# Patient Record
Sex: MALE | Race: WHITE | Employment: OTHER | URBAN - METROPOLITAN AREA
[De-identification: names, ages, dates, MRNs, and addresses within clinical notes are randomized per-mention and may not be internally consistent; named-entity substitution may affect disease eponyms.]

---

## 2018-03-19 ENCOUNTER — NURSE ONLY (OUTPATIENT)
Dept: NON INVASIVE DIAGNOSTICS | Age: 83
End: 2018-03-19
Payer: MEDICARE

## 2018-03-19 DIAGNOSIS — G45.9 TRANSIENT CEREBRAL ISCHEMIA, UNSPECIFIED TYPE: ICD-10-CM

## 2018-03-19 DIAGNOSIS — Z95.818 STATUS POST PLACEMENT OF IMPLANTABLE LOOP RECORDER: Primary | ICD-10-CM

## 2018-03-19 PROCEDURE — 93299 PR PR INTERRO EVALM REMOTE, UP TO 30 DAYS: CPT | Performed by: INTERNAL MEDICINE

## 2018-03-19 PROCEDURE — 93298 REM INTERROG DEV EVAL SCRMS: CPT | Performed by: INTERNAL MEDICINE

## 2018-03-22 ENCOUNTER — ANESTHESIA EVENT (OUTPATIENT)
Dept: OPERATING ROOM | Age: 83
End: 2018-03-22
Payer: MEDICARE

## 2018-03-22 ENCOUNTER — HOSPITAL ENCOUNTER (OUTPATIENT)
Age: 83
Setting detail: OUTPATIENT SURGERY
Discharge: HOME | End: 2018-03-22
Attending: OPHTHALMOLOGY | Admitting: OPHTHALMOLOGY
Payer: MEDICARE

## 2018-03-22 ENCOUNTER — ANESTHESIA (OUTPATIENT)
Dept: OPERATING ROOM | Age: 83
End: 2018-03-22
Payer: MEDICARE

## 2018-03-22 VITALS — SYSTOLIC BLOOD PRESSURE: 160 MMHG | OXYGEN SATURATION: 99 % | DIASTOLIC BLOOD PRESSURE: 70 MMHG

## 2018-03-22 VITALS
BODY MASS INDEX: 23.48 KG/M2 | DIASTOLIC BLOOD PRESSURE: 69 MMHG | HEIGHT: 70 IN | WEIGHT: 164 LBS | SYSTOLIC BLOOD PRESSURE: 164 MMHG | RESPIRATION RATE: 16 BRPM | TEMPERATURE: 97.2 F | HEART RATE: 56 BPM | OXYGEN SATURATION: 97 %

## 2018-03-22 PROBLEM — H25.13 AGE-RELATED NUCLEAR CATARACT OF BOTH EYES: Status: ACTIVE | Noted: 2018-03-22

## 2018-03-22 PROCEDURE — 2500000003 HC RX 250 WO HCPCS: Performed by: OPHTHALMOLOGY

## 2018-03-22 PROCEDURE — 3700000001 HC ADD 15 MINUTES (ANESTHESIA): Performed by: OPHTHALMOLOGY

## 2018-03-22 PROCEDURE — 6360000002 HC RX W HCPCS: Performed by: OPHTHALMOLOGY

## 2018-03-22 PROCEDURE — 2500000003 HC RX 250 WO HCPCS: Performed by: NURSE ANESTHETIST, CERTIFIED REGISTERED

## 2018-03-22 PROCEDURE — 6370000000 HC RX 637 (ALT 250 FOR IP): Performed by: OPHTHALMOLOGY

## 2018-03-22 PROCEDURE — 7100000011 HC PHASE II RECOVERY - ADDTL 15 MIN: Performed by: OPHTHALMOLOGY

## 2018-03-22 PROCEDURE — 6360000002 HC RX W HCPCS: Performed by: NURSE ANESTHETIST, CERTIFIED REGISTERED

## 2018-03-22 PROCEDURE — 2580000003 HC RX 258: Performed by: OPHTHALMOLOGY

## 2018-03-22 PROCEDURE — 3600000002 HC SURGERY LEVEL 2 BASE: Performed by: OPHTHALMOLOGY

## 2018-03-22 PROCEDURE — V2632 POST CHMBR INTRAOCULAR LENS: HCPCS | Performed by: OPHTHALMOLOGY

## 2018-03-22 PROCEDURE — 3700000000 HC ANESTHESIA ATTENDED CARE: Performed by: OPHTHALMOLOGY

## 2018-03-22 PROCEDURE — 3600000012 HC SURGERY LEVEL 2 ADDTL 15MIN: Performed by: OPHTHALMOLOGY

## 2018-03-22 PROCEDURE — 7100000010 HC PHASE II RECOVERY - FIRST 15 MIN: Performed by: OPHTHALMOLOGY

## 2018-03-22 DEVICE — LENS INTOCU +22.0 DIOPT L13MM DIA6MM 0DEG HAPTIC ANG A: Type: IMPLANTABLE DEVICE | Status: FUNCTIONAL

## 2018-03-22 RX ORDER — PROPOFOL 10 MG/ML
INJECTION, EMULSION INTRAVENOUS PRN
Status: DISCONTINUED | OUTPATIENT
Start: 2018-03-22 | End: 2018-03-22 | Stop reason: SDUPTHER

## 2018-03-22 RX ORDER — MOXIFLOXACIN 5 MG/ML
SOLUTION/ DROPS OPHTHALMIC PRN
Status: DISCONTINUED | OUTPATIENT
Start: 2018-03-22 | End: 2018-03-22 | Stop reason: HOSPADM

## 2018-03-22 RX ORDER — LIDOCAINE HYDROCHLORIDE 20 MG/ML
INJECTION, SOLUTION INFILTRATION; PERINEURAL PRN
Status: DISCONTINUED | OUTPATIENT
Start: 2018-03-22 | End: 2018-03-22 | Stop reason: SDUPTHER

## 2018-03-22 RX ORDER — KETOROLAC TROMETHAMINE 5 MG/ML
1 SOLUTION OPHTHALMIC
Status: COMPLETED | OUTPATIENT
Start: 2018-03-22 | End: 2018-03-22

## 2018-03-22 RX ORDER — CYCLOPENTOLATE HYDROCHLORIDE 10 MG/ML
1 SOLUTION/ DROPS OPHTHALMIC
Status: COMPLETED | OUTPATIENT
Start: 2018-03-22 | End: 2018-03-22

## 2018-03-22 RX ORDER — PREDNISOLONE ACETATE 10 MG/ML
SUSPENSION/ DROPS OPHTHALMIC PRN
Status: DISCONTINUED | OUTPATIENT
Start: 2018-03-22 | End: 2018-03-22 | Stop reason: HOSPADM

## 2018-03-22 RX ORDER — TROPICAMIDE 10 MG/ML
1 SOLUTION/ DROPS OPHTHALMIC
Status: COMPLETED | OUTPATIENT
Start: 2018-03-22 | End: 2018-03-22

## 2018-03-22 RX ORDER — PHENYLEPHRINE HCL 2.5 %
1 DROPS OPHTHALMIC (EYE)
Status: COMPLETED | OUTPATIENT
Start: 2018-03-22 | End: 2018-03-22

## 2018-03-22 RX ORDER — MOXIFLOXACIN 5 MG/ML
1 SOLUTION/ DROPS OPHTHALMIC
Status: COMPLETED | OUTPATIENT
Start: 2018-03-22 | End: 2018-03-22

## 2018-03-22 RX ORDER — SODIUM CHLORIDE 9 MG/ML
INJECTION, SOLUTION INTRAVENOUS CONTINUOUS
Status: DISCONTINUED | OUTPATIENT
Start: 2018-03-22 | End: 2018-03-22 | Stop reason: HOSPADM

## 2018-03-22 RX ADMIN — Medication 1 DROP: at 10:10

## 2018-03-22 RX ADMIN — Medication 1 DROP: at 09:40

## 2018-03-22 RX ADMIN — SODIUM CHLORIDE: 9 INJECTION, SOLUTION INTRAVENOUS at 10:06

## 2018-03-22 RX ADMIN — PHENYLEPHRINE HYDROCHLORIDE 1 DROP: 25 SOLUTION/ DROPS OPHTHALMIC at 09:55

## 2018-03-22 RX ADMIN — PHENYLEPHRINE HYDROCHLORIDE 1 DROP: 25 SOLUTION/ DROPS OPHTHALMIC at 09:40

## 2018-03-22 RX ADMIN — Medication 1 DROP: at 09:55

## 2018-03-22 RX ADMIN — PHENYLEPHRINE HYDROCHLORIDE 1 DROP: 25 SOLUTION/ DROPS OPHTHALMIC at 10:10

## 2018-03-22 RX ADMIN — KETOROLAC TROMETHAMINE 1 DROP: 5 SOLUTION OPHTHALMIC at 09:40

## 2018-03-22 RX ADMIN — Medication 1 DROP: at 10:25

## 2018-03-22 RX ADMIN — KETOROLAC TROMETHAMINE 1 DROP: 5 SOLUTION OPHTHALMIC at 10:10

## 2018-03-22 RX ADMIN — KETOROLAC TROMETHAMINE 1 DROP: 5 SOLUTION OPHTHALMIC at 10:25

## 2018-03-22 RX ADMIN — LIDOCAINE HYDROCHLORIDE 40 MG: 20 INJECTION, SOLUTION INFILTRATION; PERINEURAL at 10:59

## 2018-03-22 RX ADMIN — KETOROLAC TROMETHAMINE 1 DROP: 5 SOLUTION OPHTHALMIC at 09:55

## 2018-03-22 RX ADMIN — PROPOFOL 80 MG: 10 INJECTION, EMULSION INTRAVENOUS at 10:59

## 2018-03-22 RX ADMIN — PHENYLEPHRINE HYDROCHLORIDE 1 DROP: 25 SOLUTION/ DROPS OPHTHALMIC at 10:25

## 2018-03-22 ASSESSMENT — PAIN SCALES - GENERAL
PAINLEVEL_OUTOF10: 0
PAINLEVEL_OUTOF10: 0

## 2018-03-22 ASSESSMENT — PAIN - FUNCTIONAL ASSESSMENT: PAIN_FUNCTIONAL_ASSESSMENT: 0-10

## 2018-03-22 NOTE — ANESTHESIA PRE PROCEDURE
ASA 3       Induction: intravenous. Anesthetic plan and risks discussed with patient. Plan discussed with CRNA.                   Ha Copeland MD   3/22/2018

## 2018-04-04 ENCOUNTER — TELEPHONE (OUTPATIENT)
Dept: NON INVASIVE DIAGNOSTICS | Age: 83
End: 2018-04-04

## 2018-04-19 ENCOUNTER — NURSE ONLY (OUTPATIENT)
Dept: NON INVASIVE DIAGNOSTICS | Age: 83
End: 2018-04-19
Payer: MEDICARE

## 2018-04-19 DIAGNOSIS — R55 NEAR SYNCOPE: ICD-10-CM

## 2018-04-19 DIAGNOSIS — G45.9 TRANSIENT CEREBRAL ISCHEMIA, UNSPECIFIED TYPE: ICD-10-CM

## 2018-04-19 DIAGNOSIS — Z95.818 STATUS POST PLACEMENT OF IMPLANTABLE LOOP RECORDER: Primary | ICD-10-CM

## 2018-04-19 PROCEDURE — 93298 REM INTERROG DEV EVAL SCRMS: CPT | Performed by: INTERNAL MEDICINE

## 2018-04-19 PROCEDURE — 93299 PR PR INTERRO EVALM REMOTE, UP TO 30 DAYS: CPT | Performed by: INTERNAL MEDICINE

## 2018-06-05 ENCOUNTER — NURSE ONLY (OUTPATIENT)
Dept: NON INVASIVE DIAGNOSTICS | Age: 83
End: 2018-06-05
Payer: MEDICARE

## 2018-06-05 DIAGNOSIS — Z95.818 STATUS POST PLACEMENT OF IMPLANTABLE LOOP RECORDER: Primary | ICD-10-CM

## 2018-06-05 DIAGNOSIS — R55 NEAR SYNCOPE: ICD-10-CM

## 2018-06-05 PROCEDURE — 93298 REM INTERROG DEV EVAL SCRMS: CPT | Performed by: INTERNAL MEDICINE

## 2018-06-05 PROCEDURE — 93299 PR PR INTERRO EVALM REMOTE, UP TO 30 DAYS: CPT | Performed by: INTERNAL MEDICINE

## 2018-06-18 ENCOUNTER — TELEPHONE (OUTPATIENT)
Dept: NON INVASIVE DIAGNOSTICS | Age: 83
End: 2018-06-18

## 2018-07-06 ENCOUNTER — NURSE ONLY (OUTPATIENT)
Dept: NON INVASIVE DIAGNOSTICS | Age: 83
End: 2018-07-06
Payer: MEDICARE

## 2018-07-06 DIAGNOSIS — I44.0 FIRST DEGREE AV BLOCK: ICD-10-CM

## 2018-07-06 DIAGNOSIS — Z95.818 STATUS POST PLACEMENT OF IMPLANTABLE LOOP RECORDER: Primary | ICD-10-CM

## 2018-07-06 PROCEDURE — 93298 REM INTERROG DEV EVAL SCRMS: CPT | Performed by: INTERNAL MEDICINE

## 2018-07-06 PROCEDURE — 93299 PR PR INTERRO EVALM REMOTE, UP TO 30 DAYS: CPT | Performed by: INTERNAL MEDICINE

## 2018-07-13 ENCOUNTER — TELEPHONE (OUTPATIENT)
Dept: NON INVASIVE DIAGNOSTICS | Age: 83
End: 2018-07-13

## 2018-07-16 NOTE — PROGRESS NOTES
[facility] PRE-ADMISSION TESTING INSTRUCTIONS    The Preadmission Testing patient is instructed accordingly using the following criteria (check applicable):    ARRIVAL INSTRUCTIONS:  [x] Parking the day of Surgery is located in the Main Entrance lot. Upon entering the door, make an immediate right to the surgery reception desk    [x] [employer] Parking is available Monday through Friday 6 am to 6 pm    [x] Bring photo ID and insurance card    [] Bring in a copy of Living will or Durable Power of  papers. [x] Please be sure to arrange transportation to and from the hospital    [x] Please arrange for someone to be with you for the 24 hour period post procedure due to having anesthesia      GENERAL INSTRUCTIONS:    [x] Nothing by mouth after midnight, including gum, candy, mints or water    [x] You may brush your teeth, but do not swallow any water    [x] Take medications as instructed with 1-2 oz of water    [x] Stop herbal supplements and vitamins 5 days prior to procedure    [x] Follow preop dosing of blood thinners per physician instructions    [] Take 1/2 dose of evening insulin, but no insulin after midnight    [] No oral diabetic medications after midnight    [] If diabetic and have low blood sugar or feel symptomatic, take 1-2oz apple juice only    [] Bring inhalers day of surgery    [] Bring C-PAP/ Bi-Pap day of surgery    [] Bring urine specimen day of surgery    [x] Shower or bath with soap, lather and rinse well, AM of Surgery, no lotion, powders or creams     [] Follow bowel prep as instructed per surgeon    [x] No tobacco products within 24 hours of surgery     [] No alcohol or illegal drug use within 24 hours of surgery.     [x] Jewelry, body piercing's, eyeglasses, contact lenses and dentures are not permitted into surgery (bring cases)      [] Please do not wear any nail polish, make up or hair products on the day of surgery    [x] If not already done, you

## 2018-07-27 ENCOUNTER — ANESTHESIA (OUTPATIENT)
Dept: OPERATING ROOM | Age: 83
End: 2018-07-27
Payer: MEDICARE

## 2018-07-27 ENCOUNTER — ANESTHESIA EVENT (OUTPATIENT)
Dept: OPERATING ROOM | Age: 83
End: 2018-07-27
Payer: MEDICARE

## 2018-07-27 ENCOUNTER — HOSPITAL ENCOUNTER (OUTPATIENT)
Age: 83
Setting detail: OUTPATIENT SURGERY
Discharge: HOME | End: 2018-07-27
Attending: OTOLARYNGOLOGY | Admitting: OTOLARYNGOLOGY
Payer: MEDICARE

## 2018-07-27 VITALS
OXYGEN SATURATION: 95 % | SYSTOLIC BLOOD PRESSURE: 157 MMHG | WEIGHT: 160 LBS | BODY MASS INDEX: 22.9 KG/M2 | TEMPERATURE: 97.2 F | HEART RATE: 50 BPM | DIASTOLIC BLOOD PRESSURE: 70 MMHG | RESPIRATION RATE: 18 BRPM | HEIGHT: 70 IN

## 2018-07-27 VITALS — DIASTOLIC BLOOD PRESSURE: 63 MMHG | SYSTOLIC BLOOD PRESSURE: 140 MMHG | OXYGEN SATURATION: 100 %

## 2018-07-27 PROBLEM — H65.21 CHRONIC SEROUS OTITIS MEDIA OF RIGHT EAR: Status: ACTIVE | Noted: 2018-07-27

## 2018-07-27 PROCEDURE — 3600000002 HC SURGERY LEVEL 2 BASE: Performed by: OTOLARYNGOLOGY

## 2018-07-27 PROCEDURE — 3700000000 HC ANESTHESIA ATTENDED CARE: Performed by: OTOLARYNGOLOGY

## 2018-07-27 PROCEDURE — 7100000010 HC PHASE II RECOVERY - FIRST 15 MIN: Performed by: OTOLARYNGOLOGY

## 2018-07-27 PROCEDURE — 7100000011 HC PHASE II RECOVERY - ADDTL 15 MIN: Performed by: OTOLARYNGOLOGY

## 2018-07-27 PROCEDURE — 6370000000 HC RX 637 (ALT 250 FOR IP)

## 2018-07-27 PROCEDURE — 7100000001 HC PACU RECOVERY - ADDTL 15 MIN: Performed by: OTOLARYNGOLOGY

## 2018-07-27 PROCEDURE — 6360000002 HC RX W HCPCS: Performed by: NURSE ANESTHETIST, CERTIFIED REGISTERED

## 2018-07-27 PROCEDURE — 7100000000 HC PACU RECOVERY - FIRST 15 MIN: Performed by: OTOLARYNGOLOGY

## 2018-07-27 PROCEDURE — 6370000000 HC RX 637 (ALT 250 FOR IP): Performed by: OTOLARYNGOLOGY

## 2018-07-27 PROCEDURE — 2709999900 HC NON-CHARGEABLE SUPPLY: Performed by: OTOLARYNGOLOGY

## 2018-07-27 PROCEDURE — 2780000010 HC IMPLANT OTHER: Performed by: OTOLARYNGOLOGY

## 2018-07-27 PROCEDURE — 2500000003 HC RX 250 WO HCPCS: Performed by: NURSE ANESTHETIST, CERTIFIED REGISTERED

## 2018-07-27 PROCEDURE — 3600000012 HC SURGERY LEVEL 2 ADDTL 15MIN: Performed by: OTOLARYNGOLOGY

## 2018-07-27 PROCEDURE — 3700000001 HC ADD 15 MINUTES (ANESTHESIA): Performed by: OTOLARYNGOLOGY

## 2018-07-27 PROCEDURE — 2580000003 HC RX 258: Performed by: NURSE ANESTHETIST, CERTIFIED REGISTERED

## 2018-07-27 DEVICE — IMPLANTABLE DEVICE: Type: IMPLANTABLE DEVICE | Site: EAR | Status: FUNCTIONAL

## 2018-07-27 RX ORDER — GLYCOPYRROLATE 0.2 MG/ML
INJECTION INTRAMUSCULAR; INTRAVENOUS PRN
Status: DISCONTINUED | OUTPATIENT
Start: 2018-07-27 | End: 2018-07-27 | Stop reason: SDUPTHER

## 2018-07-27 RX ORDER — LIDOCAINE HYDROCHLORIDE 20 MG/ML
INJECTION, SOLUTION INFILTRATION; PERINEURAL PRN
Status: DISCONTINUED | OUTPATIENT
Start: 2018-07-27 | End: 2018-07-27 | Stop reason: SDUPTHER

## 2018-07-27 RX ORDER — SODIUM CHLORIDE 0.9 % (FLUSH) 0.9 %
10 SYRINGE (ML) INJECTION PRN
Status: CANCELLED | OUTPATIENT
Start: 2018-07-27

## 2018-07-27 RX ORDER — SODIUM CHLORIDE 0.9 % (FLUSH) 0.9 %
10 SYRINGE (ML) INJECTION PRN
Status: DISCONTINUED | OUTPATIENT
Start: 2018-07-27 | End: 2018-07-27 | Stop reason: HOSPADM

## 2018-07-27 RX ORDER — ACETAMINOPHEN 325 MG/1
650 TABLET ORAL EVERY 4 HOURS PRN
Status: CANCELLED | OUTPATIENT
Start: 2018-07-27

## 2018-07-27 RX ORDER — HYDROCODONE BITARTRATE AND ACETAMINOPHEN 5; 325 MG/1; MG/1
1 TABLET ORAL
Status: DISCONTINUED | OUTPATIENT
Start: 2018-07-27 | End: 2018-07-27 | Stop reason: HOSPADM

## 2018-07-27 RX ORDER — SODIUM CHLORIDE 0.9 % (FLUSH) 0.9 %
10 SYRINGE (ML) INJECTION EVERY 12 HOURS SCHEDULED
Status: CANCELLED | OUTPATIENT
Start: 2018-07-27

## 2018-07-27 RX ORDER — SODIUM CHLORIDE, SODIUM LACTATE, POTASSIUM CHLORIDE, CALCIUM CHLORIDE 600; 310; 30; 20 MG/100ML; MG/100ML; MG/100ML; MG/100ML
INJECTION, SOLUTION INTRAVENOUS CONTINUOUS
Status: CANCELLED | OUTPATIENT
Start: 2018-07-27

## 2018-07-27 RX ORDER — CIPROFLOXACIN AND DEXAMETHASONE 3; 1 MG/ML; MG/ML
4 SUSPENSION/ DROPS AURICULAR (OTIC) 2 TIMES DAILY
Qty: 1 BOTTLE | Refills: 1 | Status: SHIPPED | OUTPATIENT
Start: 2018-07-27 | End: 2018-07-30

## 2018-07-27 RX ORDER — SODIUM CHLORIDE 0.9 % (FLUSH) 0.9 %
10 SYRINGE (ML) INJECTION EVERY 12 HOURS SCHEDULED
Status: DISCONTINUED | OUTPATIENT
Start: 2018-07-27 | End: 2018-07-27 | Stop reason: HOSPADM

## 2018-07-27 RX ORDER — ONDANSETRON 2 MG/ML
INJECTION INTRAMUSCULAR; INTRAVENOUS PRN
Status: DISCONTINUED | OUTPATIENT
Start: 2018-07-27 | End: 2018-07-27 | Stop reason: SDUPTHER

## 2018-07-27 RX ORDER — ONDANSETRON 2 MG/ML
4 INJECTION INTRAMUSCULAR; INTRAVENOUS EVERY 6 HOURS PRN
Status: CANCELLED | OUTPATIENT
Start: 2018-07-27

## 2018-07-27 RX ORDER — SODIUM CHLORIDE, SODIUM LACTATE, POTASSIUM CHLORIDE, CALCIUM CHLORIDE 600; 310; 30; 20 MG/100ML; MG/100ML; MG/100ML; MG/100ML
INJECTION, SOLUTION INTRAVENOUS CONTINUOUS
Status: DISCONTINUED | OUTPATIENT
Start: 2018-07-27 | End: 2018-07-27 | Stop reason: HOSPADM

## 2018-07-27 RX ORDER — SODIUM CHLORIDE 9 MG/ML
INJECTION, SOLUTION INTRAVENOUS CONTINUOUS PRN
Status: DISCONTINUED | OUTPATIENT
Start: 2018-07-27 | End: 2018-07-27 | Stop reason: SDUPTHER

## 2018-07-27 RX ORDER — OFLOXACIN 3 MG/ML
SOLUTION/ DROPS OPHTHALMIC PRN
Status: DISCONTINUED | OUTPATIENT
Start: 2018-07-27 | End: 2018-07-27 | Stop reason: HOSPADM

## 2018-07-27 RX ORDER — DEXAMETHASONE SODIUM PHOSPHATE 4 MG/ML
INJECTION, SOLUTION INTRA-ARTICULAR; INTRALESIONAL; INTRAMUSCULAR; INTRAVENOUS; SOFT TISSUE PRN
Status: DISCONTINUED | OUTPATIENT
Start: 2018-07-27 | End: 2018-07-27 | Stop reason: SDUPTHER

## 2018-07-27 RX ORDER — FENTANYL CITRATE 50 UG/ML
50 INJECTION, SOLUTION INTRAMUSCULAR; INTRAVENOUS EVERY 5 MIN PRN
Status: DISCONTINUED | OUTPATIENT
Start: 2018-07-27 | End: 2018-07-27 | Stop reason: HOSPADM

## 2018-07-27 RX ORDER — FENTANYL CITRATE 50 UG/ML
INJECTION, SOLUTION INTRAMUSCULAR; INTRAVENOUS PRN
Status: DISCONTINUED | OUTPATIENT
Start: 2018-07-27 | End: 2018-07-27 | Stop reason: SDUPTHER

## 2018-07-27 RX ADMIN — LIDOCAINE HYDROCHLORIDE 25 MG: 20 INJECTION, SOLUTION INFILTRATION; PERINEURAL at 12:59

## 2018-07-27 RX ADMIN — SODIUM CHLORIDE: 9 INJECTION, SOLUTION INTRAVENOUS at 12:54

## 2018-07-27 RX ADMIN — FENTANYL CITRATE 25 MCG: 50 INJECTION, SOLUTION INTRAMUSCULAR; INTRAVENOUS at 12:59

## 2018-07-27 RX ADMIN — DEXAMETHASONE SODIUM PHOSPHATE 10 MG: 4 INJECTION, SOLUTION INTRAMUSCULAR; INTRAVENOUS at 12:59

## 2018-07-27 RX ADMIN — ONDANSETRON HYDROCHLORIDE 4 MG: 2 INJECTION, SOLUTION INTRAMUSCULAR; INTRAVENOUS at 12:59

## 2018-07-27 RX ADMIN — Medication 0.1 MG: at 12:59

## 2018-07-27 ASSESSMENT — PULMONARY FUNCTION TESTS
PIF_VALUE: 1
PIF_VALUE: 20
PIF_VALUE: 1
PIF_VALUE: 16
PIF_VALUE: 1
PIF_VALUE: 20
PIF_VALUE: 1
PIF_VALUE: 1
PIF_VALUE: 12
PIF_VALUE: 5
PIF_VALUE: 15
PIF_VALUE: 8
PIF_VALUE: 18
PIF_VALUE: 1
PIF_VALUE: 7
PIF_VALUE: 21
PIF_VALUE: 3
PIF_VALUE: 3

## 2018-07-27 ASSESSMENT — PAIN SCALES - GENERAL
PAINLEVEL_OUTOF10: 0

## 2018-07-27 ASSESSMENT — PAIN DESCRIPTION - PAIN TYPE: TYPE: SURGICAL PAIN

## 2018-07-27 ASSESSMENT — PAIN - FUNCTIONAL ASSESSMENT: PAIN_FUNCTIONAL_ASSESSMENT: 0-10

## 2018-07-27 NOTE — OP NOTE
7/27/2018  Sammie Santana  [id number]    Pre-operative Diagnosis: Chronic serous otitis media of right ear    Post-operative Diagnosis: same    Procedure: Right myringotomy with T-tube placement    Anesthesia: General mask inhalational anesthesia    Surgeons/Assistants: Rosa    Estimated Blood Loss: less than 94NC     Complications: None    Specimens: not obtained    Indication: Patient presented with a history of chronic serous otitis media of right ear for the last several years     Procedure: Patient was consented preoperatively, taken to the OR and identified appropriately. Patient was placed in the supine position and given to anesthesia for induction via face mask. Once the patient was anesthetized, a microscope was brought in and a speculum was placed in the right ear and the external auditory canal was cleared of cerumen with a curette. With the tympanic membrane visualized, there was not infection and was copious amount of serous effusion present. A myringotomy knife was used to make an incision in the anterior-inferior portion of the TM. Effusion was removed with a #3 Barnett tip suction until the middle ear space was cleared. A T  tube was place in the incision. Several drops of Ofloxacin were administered in the patient's external ear canal and dressed with a cotton ball. The patient was then given back to anesthesia for proper awakening. Patient tolerated the procedure with no complications. Dr. Jared Wen was present and scrubbed for key portions of the case.     Electronically signed by Seth Chi on 7/27/18 at 1:08 PM

## 2018-07-27 NOTE — ANESTHESIA POSTPROCEDURE EVALUATION
Department of Anesthesiology  Postprocedure Note    Patient: Trixie Dao  MRN: [id number]  YOB: 1928  Date of evaluation: 7/27/2018  Time:  2:52 PM     Procedure Summary     Date:  07/27/18 Room / Location:  [facility] OR 01 / [facility] OR    Anesthesia Start:  1254 Anesthesia Stop:  1320    Procedure:  RIGHT MYRINGOTOMY  T TUBE INSERTION (Right Ear) Diagnosis:  (HEARING LOSS)    Surgeon:  Zenon Kothari MD Responsible Provider:  Sandra Giordano DO    Anesthesia Type:  general ASA Status:  3          Anesthesia Type: general    Dre Phase I: Dre Score: 10    Dre Phase II: Dre Score: 10    Last vitals: Reviewed and per EMR flowsheets.        Anesthesia Post Evaluation    Patient location during evaluation: PACU  Patient participation: complete - patient participated  Level of consciousness: awake and alert  Airway patency: patent  Nausea & Vomiting: no nausea and no vomiting  Complications: no  Cardiovascular status: hemodynamically stable  Respiratory status: acceptable  Hydration status: euvolemic

## 2018-07-27 NOTE — BRIEF OP NOTE
Brief Postoperative Note  ______________________________________________________________    Patient: Tree Goldsmith  YOB: 1928  MRN: [id number]  Date of Procedure: 7/27/2018    Pre-Op Diagnosis: HEARING LOSS    Post-Op Diagnosis: Same       Procedure(s):  RIGHT MYRINGOTOMY POSS T TUBE INSERTION    Anesthesia: Anesthesia type not filed in the log.     Surgeon(s):  Ariadne Adam MD    Staff:  Nikki Lan Person First: Shyam Dorantes     Estimated Blood Loss: * No values recorded between 7/27/2018 12:00 AM and 7/27/2018 31:59 PM * mL    Complications: None    Specimens:   * No specimens in log *    Implants:  * No implants in log *      Drains:      Findings: See dictation    Moy Kilgore MD  Date: 7/27/2018  Time: 12:32 PM

## 2018-07-27 NOTE — H&P
The H&P has been reviewed, the patient has been examined, and I concur with the findings of the 7/2018 H&P.

## 2018-07-27 NOTE — ANESTHESIA PRE PROCEDURE
 Hyperlipidemia E78.5    Orthostatic hypotension I95.1    Near syncope R55    Transient cerebral ischemia G45.9    Status post placement of implantable loop recorder Z95.818    Age-related nuclear cataract of both eyes H25.13       Past Medical History:        Diagnosis Date    Bradycardia     hx of / follows with Dr. Reymundo Nelson Depression     with anxiety    Dizziness     as of 7/16/2018 denies any current issues. / has Loop Recorder in place / follows with Dr. Reymundo Nelson Fluid level behind tympanic membrane of right ear     with retraction    Full dentures     [place] (hard of hearing[place]      aids x 2    Hypertension     Snores     Tremors of nervous system     of hands only       Past Surgical History:        Procedure Laterality Date    CATARACT REMOVAL WITH IMPLANT Left 03/22/2018     LEFT EYE CATARACT EMULSIFICATION IOL IMPLANT  (Left Eye)    COLECTOMY  2012?     benign mass of colon    HERNIA REPAIR  years ago    inguinal    INSERTABLE CARDIAC MONITOR  04/25/2016    left upper chest    MYRINGOTOMY AND TYMPANOSTOMY TUBE PLACEMENT Right 4/14/16    [place] REMV CATARACT EXTRACAP,INSERT LENS Left 3/22/2018    LEFT EYE CATARACT EMULSIFICATION IOL IMPLANT  performed by [facility] at [facility] 22 TYMPANOSTOMY TUBE PLACEMENT Right     x2       Social History:    Social History   Substance Use Topics    Smoking status: Former Smoker     Quit date: 7/16/1962    Smokeless tobacco: Current User     Types: Chew    Alcohol use No                                Ready to quit: Not Answered  Counseling given: Not Answered      Vital Signs (Current):   Vitals:    07/16/18 1438 07/27/18 0953   BP:  (!) 151/72   Pulse:  (!) 45   Resp:  18   Temp:  97.3 °F (36.3 °C)   TempSrc:  Temporal   SpO2:  95%   Weight: 160 lb (72.6 kg) 160 lb (72.6 kg)   Height: 5' 10\" (1.778 m) 5' 10\" (1.778 m)                                              BP Readings from Last 3 Encounters:   07/27/18 (!) 151/72   03/22/18 (!) hypotension     Neuro/Psych:   (+) TIA, depression/anxiety    (-) psychiatric history            ROS comment: Near syncope GI/Hepatic/Renal: Neg GI/Hepatic/Renal ROS            Endo/Other: Negative Endo/Other ROS                     ROS comment: Fluid level behind tympanic membrane of right ear with retraction Abdominal:           Vascular: negative vascular ROS. Anesthesia Plan      general     ASA 3       Induction: intravenous. MIPS: Postoperative opioids intended. Anesthetic plan and risks discussed with patient. Plan discussed with CRNA.             [address] Manny Rao,    7/27/2018

## 2018-08-14 ENCOUNTER — NURSE ONLY (OUTPATIENT)
Dept: NON INVASIVE DIAGNOSTICS | Age: 83
End: 2018-08-14
Payer: MEDICARE

## 2018-08-14 ENCOUNTER — TELEPHONE (OUTPATIENT)
Dept: NON INVASIVE DIAGNOSTICS | Age: 83
End: 2018-08-14

## 2018-08-14 DIAGNOSIS — Z95.818 STATUS POST PLACEMENT OF IMPLANTABLE LOOP RECORDER: Primary | ICD-10-CM

## 2018-08-14 DIAGNOSIS — R55 SYNCOPE, UNSPECIFIED SYNCOPE TYPE: ICD-10-CM

## 2018-08-14 PROCEDURE — 93299 PR PR INTERRO EVALM REMOTE, UP TO 30 DAYS: CPT | Performed by: INTERNAL MEDICINE

## 2018-08-14 PROCEDURE — 93298 REM INTERROG DEV EVAL SCRMS: CPT | Performed by: INTERNAL MEDICINE

## 2018-08-14 NOTE — TELEPHONE ENCOUNTER
----- Message from Thai Epstein RN sent at 8/14/2018 11:23 AM EDT -----  Successful transmission received. Please call patient and give next appointment.

## 2018-09-14 ENCOUNTER — NURSE ONLY (OUTPATIENT)
Dept: NON INVASIVE DIAGNOSTICS | Age: 83
End: 2018-09-14
Payer: MEDICARE

## 2018-09-14 DIAGNOSIS — R55 NEAR SYNCOPE: ICD-10-CM

## 2018-09-14 DIAGNOSIS — G45.9 TRANSIENT CEREBRAL ISCHEMIA, UNSPECIFIED TYPE: ICD-10-CM

## 2018-09-14 DIAGNOSIS — Z95.818 STATUS POST PLACEMENT OF IMPLANTABLE LOOP RECORDER: Primary | ICD-10-CM

## 2018-09-14 PROCEDURE — 93298 REM INTERROG DEV EVAL SCRMS: CPT | Performed by: INTERNAL MEDICINE

## 2018-09-14 PROCEDURE — 93299 PR PR INTERRO EVALM REMOTE, UP TO 30 DAYS: CPT | Performed by: INTERNAL MEDICINE

## 2018-09-25 ENCOUNTER — TELEPHONE (OUTPATIENT)
Dept: NON INVASIVE DIAGNOSTICS | Age: 83
End: 2018-09-25

## 2018-09-25 NOTE — TELEPHONE ENCOUNTER
Left VM for patient to call office. We have received your remote transmission. Our staff will contact you if there is anything that needs to be discussed. Your next appointment is October 15, 2015 for remote transmission.

## 2018-09-25 NOTE — PROGRESS NOTES
See [employer] [place] report. Remote monitoring reviewed over a 30 day period. End of 30 day monitoring period date of service 9-.

## 2018-09-26 ENCOUNTER — TELEPHONE (OUTPATIENT)
Dept: CARDIOLOGY CLINIC | Age: 83
End: 2018-09-26

## 2018-10-15 ENCOUNTER — NURSE ONLY (OUTPATIENT)
Dept: NON INVASIVE DIAGNOSTICS | Age: 83
End: 2018-10-15
Payer: MEDICARE

## 2018-10-15 DIAGNOSIS — R55 NEAR SYNCOPE: Primary | ICD-10-CM

## 2018-10-15 DIAGNOSIS — Z95.818 STATUS POST PLACEMENT OF IMPLANTABLE LOOP RECORDER: ICD-10-CM

## 2018-10-15 PROCEDURE — 93299 PR PR INTERRO EVALM REMOTE, UP TO 30 DAYS: CPT | Performed by: INTERNAL MEDICINE

## 2018-10-15 PROCEDURE — 93298 REM INTERROG DEV EVAL SCRMS: CPT | Performed by: INTERNAL MEDICINE

## 2018-10-23 ENCOUNTER — TELEPHONE (OUTPATIENT)
Dept: NON INVASIVE DIAGNOSTICS | Age: 83
End: 2018-10-23

## 2018-10-23 NOTE — TELEPHONE ENCOUNTER
We have received your remote transmission. Our staff will contact you if there is anything that needs to be discussed. Your next appointment is November 15, 2018 for remote transmission. Patient's wife verbalized understanding.

## 2018-10-26 NOTE — PROGRESS NOTES
[facility] PRE-ADMISSION TESTING INSTRUCTIONS    The Preadmission Testing patient is instructed accordingly using the following criteria (check applicable):    ARRIVAL INSTRUCTIONS:  [x] Parking the day of Surgery is located in the Main Entrance lot. Upon entering the door, make an immediate right to the surgery reception desk    [x] [employer] Parking is available Monday through Friday 6 am to 6 pm    [x] Bring photo ID and insurance card    [] Bring in a copy of Living will or Durable Power of  papers. [x] Please be sure to arrange for responsible adult to provide transportation to and from the hospital    [x] Please arrange for responsible adult to be with you for the 24 hour period post procedure due to having anesthesia      GENERAL INSTRUCTIONS:    [x] Nothing by mouth after midnight, including gum, candy, mints or water    [x] You may brush your teeth, but do not swallow any water    [x] Take medications as instructed with 1-2 oz of water    [x] Stop herbal supplements and vitamins 5 days prior to procedure    [] Follow preop dosing of blood thinners per physician instructions    [] Take 1/2 dose of evening insulin, but no insulin after midnight    [] No oral diabetic medications after midnight    [] If diabetic and have low blood sugar or feel symptomatic, take 1-2oz apple juice only    [] Bring inhalers day of surgery    [] Bring C-PAP/ Bi-Pap day of surgery    [] Bring urine specimen day of surgery    [x] Shower or bath with soap, lather and rinse well, AM of Surgery, no lotion, powders or creams to surgical site    [] Follow bowel prep as instructed per surgeon    [x] No tobacco products within 24 hours of surgery     [x] No alcohol or illegal drug use within 24 hours of surgery.     [x] Jewelry, body piercing's, eyeglasses, contact lenses and dentures are not permitted into surgery (bring cases)      [] Please do not wear any nail polish, make up or hair

## 2018-11-01 ENCOUNTER — HOSPITAL ENCOUNTER (OUTPATIENT)
Age: 83
Setting detail: OUTPATIENT SURGERY
Discharge: HOME | End: 2018-11-01
Attending: OPHTHALMOLOGY | Admitting: OPHTHALMOLOGY
Payer: MEDICARE

## 2018-11-01 ENCOUNTER — ANESTHESIA (OUTPATIENT)
Dept: OPERATING ROOM | Age: 83
End: 2018-11-01
Payer: MEDICARE

## 2018-11-01 ENCOUNTER — ANESTHESIA EVENT (OUTPATIENT)
Dept: OPERATING ROOM | Age: 83
End: 2018-11-01
Payer: MEDICARE

## 2018-11-01 VITALS
OXYGEN SATURATION: 98 % | HEIGHT: 70 IN | DIASTOLIC BLOOD PRESSURE: 81 MMHG | TEMPERATURE: 97.8 F | BODY MASS INDEX: 22.9 KG/M2 | WEIGHT: 160 LBS | SYSTOLIC BLOOD PRESSURE: 185 MMHG | RESPIRATION RATE: 18 BRPM | HEART RATE: 60 BPM

## 2018-11-01 VITALS — DIASTOLIC BLOOD PRESSURE: 76 MMHG | OXYGEN SATURATION: 100 % | SYSTOLIC BLOOD PRESSURE: 176 MMHG

## 2018-11-01 PROCEDURE — 6370000000 HC RX 637 (ALT 250 FOR IP)

## 2018-11-01 PROCEDURE — 7100000011 HC PHASE II RECOVERY - ADDTL 15 MIN: Performed by: OPHTHALMOLOGY

## 2018-11-01 PROCEDURE — 6370000000 HC RX 637 (ALT 250 FOR IP): Performed by: OPHTHALMOLOGY

## 2018-11-01 PROCEDURE — V2632 POST CHMBR INTRAOCULAR LENS: HCPCS | Performed by: OPHTHALMOLOGY

## 2018-11-01 PROCEDURE — 3600000002 HC SURGERY LEVEL 2 BASE: Performed by: OPHTHALMOLOGY

## 2018-11-01 PROCEDURE — 6360000002 HC RX W HCPCS: Performed by: NURSE ANESTHETIST, CERTIFIED REGISTERED

## 2018-11-01 PROCEDURE — 3600000012 HC SURGERY LEVEL 2 ADDTL 15MIN: Performed by: OPHTHALMOLOGY

## 2018-11-01 PROCEDURE — 7100000010 HC PHASE II RECOVERY - FIRST 15 MIN: Performed by: OPHTHALMOLOGY

## 2018-11-01 PROCEDURE — 3700000000 HC ANESTHESIA ATTENDED CARE: Performed by: OPHTHALMOLOGY

## 2018-11-01 PROCEDURE — 6360000002 HC RX W HCPCS: Performed by: OPHTHALMOLOGY

## 2018-11-01 PROCEDURE — 3700000001 HC ADD 15 MINUTES (ANESTHESIA): Performed by: OPHTHALMOLOGY

## 2018-11-01 PROCEDURE — 2580000003 HC RX 258: Performed by: NURSE ANESTHETIST, CERTIFIED REGISTERED

## 2018-11-01 PROCEDURE — 2500000003 HC RX 250 WO HCPCS: Performed by: NURSE ANESTHETIST, CERTIFIED REGISTERED

## 2018-11-01 PROCEDURE — 2500000003 HC RX 250 WO HCPCS: Performed by: OPHTHALMOLOGY

## 2018-11-01 PROCEDURE — 2709999900 HC NON-CHARGEABLE SUPPLY: Performed by: OPHTHALMOLOGY

## 2018-11-01 DEVICE — LENS IOL SN60WF 20.5D: Type: IMPLANTABLE DEVICE | Site: EYE | Status: FUNCTIONAL

## 2018-11-01 RX ORDER — PREDNISOLONE ACETATE 10 MG/ML
SUSPENSION/ DROPS OPHTHALMIC PRN
Status: DISCONTINUED | OUTPATIENT
Start: 2018-11-01 | End: 2018-11-01 | Stop reason: HOSPADM

## 2018-11-01 RX ORDER — TETRACAINE HYDROCHLORIDE 5 MG/ML
SOLUTION OPHTHALMIC PRN
Status: DISCONTINUED | OUTPATIENT
Start: 2018-11-01 | End: 2018-11-01 | Stop reason: HOSPADM

## 2018-11-01 RX ORDER — SODIUM CHLORIDE 9 MG/ML
INJECTION, SOLUTION INTRAVENOUS CONTINUOUS
Status: DISCONTINUED | OUTPATIENT
Start: 2018-11-01 | End: 2018-11-01 | Stop reason: HOSPADM

## 2018-11-01 RX ORDER — PHENYLEPHRINE HCL 2.5 %
1 DROPS OPHTHALMIC (EYE)
Status: COMPLETED | OUTPATIENT
Start: 2018-11-01 | End: 2018-11-01

## 2018-11-01 RX ORDER — MOXIFLOXACIN 5 MG/ML
1 SOLUTION/ DROPS OPHTHALMIC
Status: COMPLETED | OUTPATIENT
Start: 2018-11-01 | End: 2018-11-01

## 2018-11-01 RX ORDER — TROPICAMIDE 10 MG/ML
1 SOLUTION/ DROPS OPHTHALMIC
Status: COMPLETED | OUTPATIENT
Start: 2018-11-01 | End: 2018-11-01

## 2018-11-01 RX ORDER — PROPOFOL 10 MG/ML
INJECTION, EMULSION INTRAVENOUS PRN
Status: DISCONTINUED | OUTPATIENT
Start: 2018-11-01 | End: 2018-11-01 | Stop reason: SDUPTHER

## 2018-11-01 RX ORDER — GLYCOPYRROLATE 0.2 MG/ML
INJECTION INTRAMUSCULAR; INTRAVENOUS PRN
Status: DISCONTINUED | OUTPATIENT
Start: 2018-11-01 | End: 2018-11-01 | Stop reason: SDUPTHER

## 2018-11-01 RX ORDER — CYCLOPENTOLATE HYDROCHLORIDE 10 MG/ML
1 SOLUTION/ DROPS OPHTHALMIC
Status: COMPLETED | OUTPATIENT
Start: 2018-11-01 | End: 2018-11-01

## 2018-11-01 RX ORDER — KETOROLAC TROMETHAMINE 5 MG/ML
1 SOLUTION OPHTHALMIC
Status: COMPLETED | OUTPATIENT
Start: 2018-11-01 | End: 2018-11-01

## 2018-11-01 RX ORDER — SODIUM CHLORIDE, SODIUM LACTATE, POTASSIUM CHLORIDE, CALCIUM CHLORIDE 600; 310; 30; 20 MG/100ML; MG/100ML; MG/100ML; MG/100ML
INJECTION, SOLUTION INTRAVENOUS CONTINUOUS PRN
Status: DISCONTINUED | OUTPATIENT
Start: 2018-11-01 | End: 2018-11-01 | Stop reason: SDUPTHER

## 2018-11-01 RX ORDER — MOXIFLOXACIN 5 MG/ML
SOLUTION/ DROPS OPHTHALMIC PRN
Status: DISCONTINUED | OUTPATIENT
Start: 2018-11-01 | End: 2018-11-01 | Stop reason: HOSPADM

## 2018-11-01 RX ORDER — LIDOCAINE HYDROCHLORIDE 10 MG/ML
INJECTION, SOLUTION EPIDURAL; INFILTRATION; INTRACAUDAL; PERINEURAL PRN
Status: DISCONTINUED | OUTPATIENT
Start: 2018-11-01 | End: 2018-11-01 | Stop reason: SDUPTHER

## 2018-11-01 RX ADMIN — PHENYLEPHRINE HYDROCHLORIDE 1 DROP: 25 SOLUTION/ DROPS OPHTHALMIC at 08:25

## 2018-11-01 RX ADMIN — Medication 1 DROP: at 08:10

## 2018-11-01 RX ADMIN — KETOROLAC TROMETHAMINE 1 DROP: 5 SOLUTION OPHTHALMIC at 07:55

## 2018-11-01 RX ADMIN — PHENYLEPHRINE HYDROCHLORIDE 1 DROP: 25 SOLUTION/ DROPS OPHTHALMIC at 07:55

## 2018-11-01 RX ADMIN — KETOROLAC TROMETHAMINE 1 DROP: 5 SOLUTION OPHTHALMIC at 08:40

## 2018-11-01 RX ADMIN — PHENYLEPHRINE HYDROCHLORIDE 1 DROP: 25 SOLUTION/ DROPS OPHTHALMIC at 08:10

## 2018-11-01 RX ADMIN — GLYCOPYRROLATE 0.2 MG: 0.2 INJECTION, SOLUTION INTRAMUSCULAR; INTRAVENOUS at 08:55

## 2018-11-01 RX ADMIN — SODIUM CHLORIDE, POTASSIUM CHLORIDE, SODIUM LACTATE AND CALCIUM CHLORIDE: 600; 310; 30; 20 INJECTION, SOLUTION INTRAVENOUS at 08:49

## 2018-11-01 RX ADMIN — Medication 1 DROP: at 08:25

## 2018-11-01 RX ADMIN — PHENYLEPHRINE HYDROCHLORIDE 1 DROP: 25 SOLUTION/ DROPS OPHTHALMIC at 08:40

## 2018-11-01 RX ADMIN — KETOROLAC TROMETHAMINE 1 DROP: 5 SOLUTION OPHTHALMIC at 08:25

## 2018-11-01 RX ADMIN — Medication 1 DROP: at 07:55

## 2018-11-01 RX ADMIN — Medication 1 DROP: at 08:40

## 2018-11-01 RX ADMIN — KETOROLAC TROMETHAMINE 1 DROP: 5 SOLUTION OPHTHALMIC at 08:10

## 2018-11-01 RX ADMIN — LIDOCAINE HYDROCHLORIDE 20 MG: 10 INJECTION, SOLUTION EPIDURAL; INFILTRATION; INTRACAUDAL; PERINEURAL at 08:58

## 2018-11-01 RX ADMIN — PROPOFOL 80 MG: 10 INJECTION, EMULSION INTRAVENOUS at 08:58

## 2018-11-01 ASSESSMENT — PAIN - FUNCTIONAL ASSESSMENT: PAIN_FUNCTIONAL_ASSESSMENT: 0-10

## 2018-11-01 ASSESSMENT — PAIN SCALES - GENERAL
PAINLEVEL_OUTOF10: 1
PAINLEVEL_OUTOF10: 1

## 2018-11-01 NOTE — ANESTHESIA PRE PROCEDURE
complications:   Airway: Mallampati: I  TM distance: >3 FB   Neck ROM: full   Dental: normal exam         Pulmonary: breath sounds clear to auscultation                            ROS comment: Former Smoker   Cardiovascular:    (+) hypertension:, hyperlipidemia        Rhythm: regular  Rate: normal           Beta Blocker:  Not on Beta Blocker      ROS comment: Orthostatic hypotension     Neuro/Psych:   (+) TIA, depression/anxiety              ROS comment: Near syncope GI/Hepatic/Renal: Neg GI/Hepatic/Renal ROS            Endo/Other: Negative Endo/Other ROS                    Abdominal:           Vascular: negative vascular ROS. Anesthesia Plan      MAC     ASA 3       Induction: intravenous. Anesthetic plan and risks discussed with patient. Plan discussed with CRNA.                   Paola Cheung MD   11/1/2018

## 2018-11-01 NOTE — OP NOTE
residual cortex was then removed using an aspiration/irrigation unit. The capsular bag was then reconstituted with Provisc. A posterior chamber intraocular lens was then injected into the capsular bag. An SN 60 WF 20.5 D lens was used. The aspiration-irrigation united was then used to remove the residual Provisc. The pupil was then brought down with Miostat. The wound was then tested and found to be watertight. One suture was then placed through the flap, then closed and tied. The conjunctiva was then carefully draped over the surgical wound and then cauterized. The lid speculum  was removed. Pred Forte 1% solution,  Vigamox drops and TobraDex ointment  were then instilled into the eye. The eye was noted to be in good condition. A patch and shield were then placed over the operated eye. The patient was transferred to the recovery room in good condition. Moraima Delgado M.D.

## 2018-11-15 ENCOUNTER — NURSE ONLY (OUTPATIENT)
Dept: NON INVASIVE DIAGNOSTICS | Age: 83
End: 2018-11-15
Payer: MEDICARE

## 2018-11-15 DIAGNOSIS — R55 NEAR SYNCOPE: ICD-10-CM

## 2018-11-15 DIAGNOSIS — Z95.818 STATUS POST PLACEMENT OF IMPLANTABLE LOOP RECORDER: Primary | ICD-10-CM

## 2018-11-15 PROCEDURE — 93299 PR PR INTERRO EVALM REMOTE, UP TO 30 DAYS: CPT | Performed by: INTERNAL MEDICINE

## 2018-11-15 PROCEDURE — 93298 REM INTERROG DEV EVAL SCRMS: CPT | Performed by: INTERNAL MEDICINE

## 2018-11-20 NOTE — PROGRESS NOTES
See [employer] [place] report. Remote monitoring reviewed over a 30 day period. End of 30 day monitoring period date of service 11-15-18.

## 2018-11-21 ENCOUNTER — TELEPHONE (OUTPATIENT)
Dept: NON INVASIVE DIAGNOSTICS | Age: 83
End: 2018-11-21

## 2019-01-28 ENCOUNTER — NURSE ONLY (OUTPATIENT)
Dept: NON INVASIVE DIAGNOSTICS | Age: 84
End: 2019-01-28
Payer: MEDICARE

## 2019-01-28 DIAGNOSIS — Z95.818 STATUS POST PLACEMENT OF IMPLANTABLE LOOP RECORDER: Primary | ICD-10-CM

## 2019-01-28 DIAGNOSIS — R55 SYNCOPE, UNSPECIFIED SYNCOPE TYPE: ICD-10-CM

## 2019-01-28 PROCEDURE — 93299 PR REM INTERROG ICPMS/SCRMS <30 D TECH REVIEW: CPT | Performed by: INTERNAL MEDICINE

## 2019-01-28 PROCEDURE — 93298 REM INTERROG DEV EVAL SCRMS: CPT | Performed by: INTERNAL MEDICINE

## 2019-01-30 ENCOUNTER — TELEPHONE (OUTPATIENT)
Dept: CARDIOLOGY CLINIC | Age: 84
End: 2019-01-30

## 2019-02-08 ENCOUNTER — OFFICE VISIT (OUTPATIENT)
Dept: NON INVASIVE DIAGNOSTICS | Age: 84
End: 2019-02-08
Payer: MEDICARE

## 2019-02-08 VITALS
SYSTOLIC BLOOD PRESSURE: 138 MMHG | DIASTOLIC BLOOD PRESSURE: 68 MMHG | WEIGHT: 158 LBS | HEART RATE: 58 BPM | HEIGHT: 70 IN | RESPIRATION RATE: 16 BRPM | BODY MASS INDEX: 22.62 KG/M2

## 2019-02-08 DIAGNOSIS — Z95.818 STATUS POST PLACEMENT OF IMPLANTABLE LOOP RECORDER: Primary | ICD-10-CM

## 2019-02-08 PROCEDURE — 93285 PRGRMG DEV EVAL SCRMS IP: CPT | Performed by: NURSE PRACTITIONER

## 2019-02-08 PROCEDURE — 99214 OFFICE O/P EST MOD 30 MIN: CPT | Performed by: NURSE PRACTITIONER

## 2019-02-08 PROCEDURE — G8427 DOCREV CUR MEDS BY ELIG CLIN: HCPCS | Performed by: NURSE PRACTITIONER

## 2019-02-08 PROCEDURE — 1101F PT FALLS ASSESS-DOCD LE1/YR: CPT | Performed by: NURSE PRACTITIONER

## 2019-02-08 PROCEDURE — G8420 CALC BMI NORM PARAMETERS: HCPCS | Performed by: NURSE PRACTITIONER

## 2019-02-08 PROCEDURE — 4040F PNEUMOC VAC/ADMIN/RCVD: CPT | Performed by: NURSE PRACTITIONER

## 2019-02-08 PROCEDURE — 1123F ACP DISCUSS/DSCN MKR DOCD: CPT | Performed by: NURSE PRACTITIONER

## 2019-02-08 PROCEDURE — G8482 FLU IMMUNIZE ORDER/ADMIN: HCPCS | Performed by: NURSE PRACTITIONER

## 2019-02-08 PROCEDURE — 4004F PT TOBACCO SCREEN RCVD TLK: CPT | Performed by: NURSE PRACTITIONER

## 2019-02-08 RX ORDER — TAMSULOSIN HYDROCHLORIDE 0.4 MG/1
0.4 CAPSULE ORAL DAILY
Status: ON HOLD | COMMUNITY
Start: 2019-01-15 | End: 2019-08-05 | Stop reason: HOSPADM

## 2019-02-08 RX ORDER — OXYBUTYNIN CHLORIDE 5 MG/1
5 TABLET, EXTENDED RELEASE ORAL DAILY
Status: ON HOLD | COMMUNITY
Start: 2019-01-15 | End: 2019-08-05 | Stop reason: HOSPADM

## 2019-02-09 ASSESSMENT — ENCOUNTER SYMPTOMS
SHORTNESS OF BREATH: 0
CHEST TIGHTNESS: 0

## 2019-03-11 ENCOUNTER — NURSE ONLY (OUTPATIENT)
Dept: NON INVASIVE DIAGNOSTICS | Age: 84
End: 2019-03-11
Payer: MEDICARE

## 2019-03-11 DIAGNOSIS — Z95.818 STATUS POST PLACEMENT OF IMPLANTABLE LOOP RECORDER: Primary | ICD-10-CM

## 2019-03-11 DIAGNOSIS — R55 SYNCOPE, UNSPECIFIED SYNCOPE TYPE: ICD-10-CM

## 2019-03-11 PROCEDURE — 93298 REM INTERROG DEV EVAL SCRMS: CPT | Performed by: INTERNAL MEDICINE

## 2019-03-11 PROCEDURE — 93299 PR REM INTERROG ICPMS/SCRMS <30 D TECH REVIEW: CPT | Performed by: INTERNAL MEDICINE

## 2019-03-13 ENCOUNTER — ANESTHESIA EVENT (OUTPATIENT)
Dept: OPERATING ROOM | Age: 84
End: 2019-03-13
Payer: MEDICARE

## 2019-03-14 ENCOUNTER — HOSPITAL ENCOUNTER (OUTPATIENT)
Age: 84
Setting detail: OUTPATIENT SURGERY
Discharge: HOME | End: 2019-03-14
Attending: OTOLARYNGOLOGY | Admitting: OTOLARYNGOLOGY
Payer: MEDICARE

## 2019-03-14 ENCOUNTER — ANESTHESIA (OUTPATIENT)
Dept: OPERATING ROOM | Age: 84
End: 2019-03-14
Payer: MEDICARE

## 2019-03-14 VITALS — SYSTOLIC BLOOD PRESSURE: 142 MMHG | OXYGEN SATURATION: 100 % | DIASTOLIC BLOOD PRESSURE: 63 MMHG

## 2019-03-14 VITALS
HEART RATE: 52 BPM | TEMPERATURE: 96.8 F | OXYGEN SATURATION: 95 % | DIASTOLIC BLOOD PRESSURE: 65 MMHG | HEIGHT: 70 IN | BODY MASS INDEX: 22.9 KG/M2 | RESPIRATION RATE: 16 BRPM | SYSTOLIC BLOOD PRESSURE: 147 MMHG | WEIGHT: 160 LBS

## 2019-03-14 LAB
ANION GAP SERPL CALCULATED.3IONS-SCNC: 10 MMOL/L (ref 7–16)
BUN BLDV-MCNC: 11 MG/DL (ref 8–23)
CALCIUM SERPL-MCNC: 8.7 MG/DL (ref 8.6–10.2)
CHLORIDE BLD-SCNC: 105 MMOL/L (ref 98–107)
CO2: 26 MMOL/L (ref 22–29)
CREAT SERPL-MCNC: 1.1 MG/DL (ref 0.7–1.2)
GFR AFRICAN AMERICAN: >60
GFR NON-AFRICAN AMERICAN: >60 ML/MIN/1.73
GLUCOSE BLD-MCNC: 88 MG/DL (ref 74–99)
HCT VFR BLD CALC: 33.7 % (ref 37–54)
HEMOGLOBIN: 11.3 G/DL (ref 12.5–16.5)
MCH RBC QN AUTO: 31.8 PG (ref 26–35)
MCHC RBC AUTO-ENTMCNC: 33.5 % (ref 32–34.5)
MCV RBC AUTO: 94.9 FL (ref 80–99.9)
PDW BLD-RTO: 13 FL (ref 11.5–15)
PLATELET # BLD: 143 E9/L (ref 130–450)
PMV BLD AUTO: 10.5 FL (ref 7–12)
POTASSIUM SERPL-SCNC: 4.1 MMOL/L (ref 3.5–5)
RBC # BLD: 3.55 E12/L (ref 3.8–5.8)
REASON FOR REJECTION: NORMAL
REASON FOR REJECTION: NORMAL
REJECTED TEST: NORMAL
REJECTED TEST: NORMAL
SODIUM BLD-SCNC: 141 MMOL/L (ref 132–146)
WBC # BLD: 3.9 E9/L (ref 4.5–11.5)

## 2019-03-14 PROCEDURE — 3700000000 HC ANESTHESIA ATTENDED CARE: Performed by: OTOLARYNGOLOGY

## 2019-03-14 PROCEDURE — 6370000000 HC RX 637 (ALT 250 FOR IP): Performed by: OTOLARYNGOLOGY

## 2019-03-14 PROCEDURE — 2500000003 HC RX 250 WO HCPCS: Performed by: NURSE ANESTHETIST, CERTIFIED REGISTERED

## 2019-03-14 PROCEDURE — 3600000012 HC SURGERY LEVEL 2 ADDTL 15MIN: Performed by: OTOLARYNGOLOGY

## 2019-03-14 PROCEDURE — 3600000002 HC SURGERY LEVEL 2 BASE: Performed by: OTOLARYNGOLOGY

## 2019-03-14 PROCEDURE — 2580000003 HC RX 258: Performed by: OTOLARYNGOLOGY

## 2019-03-14 PROCEDURE — 7100000001 HC PACU RECOVERY - ADDTL 15 MIN: Performed by: OTOLARYNGOLOGY

## 2019-03-14 PROCEDURE — 2709999900 HC NON-CHARGEABLE SUPPLY: Performed by: OTOLARYNGOLOGY

## 2019-03-14 PROCEDURE — 85027 COMPLETE CBC AUTOMATED: CPT

## 2019-03-14 PROCEDURE — 3700000001 HC ADD 15 MINUTES (ANESTHESIA): Performed by: OTOLARYNGOLOGY

## 2019-03-14 PROCEDURE — 6360000002 HC RX W HCPCS: Performed by: NURSE ANESTHETIST, CERTIFIED REGISTERED

## 2019-03-14 PROCEDURE — 36415 COLL VENOUS BLD VENIPUNCTURE: CPT

## 2019-03-14 PROCEDURE — 7100000000 HC PACU RECOVERY - FIRST 15 MIN: Performed by: OTOLARYNGOLOGY

## 2019-03-14 PROCEDURE — 2780000010 HC IMPLANT OTHER: Performed by: OTOLARYNGOLOGY

## 2019-03-14 PROCEDURE — 80048 BASIC METABOLIC PNL TOTAL CA: CPT

## 2019-03-14 PROCEDURE — 6370000000 HC RX 637 (ALT 250 FOR IP)

## 2019-03-14 PROCEDURE — 7100000011 HC PHASE II RECOVERY - ADDTL 15 MIN: Performed by: OTOLARYNGOLOGY

## 2019-03-14 PROCEDURE — 7100000010 HC PHASE II RECOVERY - FIRST 15 MIN: Performed by: OTOLARYNGOLOGY

## 2019-03-14 DEVICE — IMPLANTABLE DEVICE: Type: IMPLANTABLE DEVICE | Site: EAR | Status: FUNCTIONAL

## 2019-03-14 RX ORDER — CLINDAMYCIN PHOSPHATE 150 MG/ML
INJECTION, SOLUTION INTRAVENOUS PRN
Status: DISCONTINUED | OUTPATIENT
Start: 2019-03-14 | End: 2019-03-14 | Stop reason: SDUPTHER

## 2019-03-14 RX ORDER — CLINDAMYCIN PHOSPHATE 900 MG/50ML
900 INJECTION INTRAVENOUS
Status: DISCONTINUED | OUTPATIENT
Start: 2019-03-14 | End: 2019-03-14 | Stop reason: HOSPADM

## 2019-03-14 RX ORDER — OFLOXACIN 3 MG/ML
SOLUTION/ DROPS OPHTHALMIC PRN
Status: DISCONTINUED | OUTPATIENT
Start: 2019-03-14 | End: 2019-03-14 | Stop reason: ALTCHOICE

## 2019-03-14 RX ORDER — PROPOFOL 10 MG/ML
INJECTION, EMULSION INTRAVENOUS PRN
Status: DISCONTINUED | OUTPATIENT
Start: 2019-03-14 | End: 2019-03-14 | Stop reason: SDUPTHER

## 2019-03-14 RX ORDER — SODIUM CHLORIDE, SODIUM LACTATE, POTASSIUM CHLORIDE, CALCIUM CHLORIDE 600; 310; 30; 20 MG/100ML; MG/100ML; MG/100ML; MG/100ML
INJECTION, SOLUTION INTRAVENOUS CONTINUOUS
Status: DISCONTINUED | OUTPATIENT
Start: 2019-03-14 | End: 2019-03-14 | Stop reason: HOSPADM

## 2019-03-14 RX ORDER — ONDANSETRON 2 MG/ML
INJECTION INTRAMUSCULAR; INTRAVENOUS PRN
Status: DISCONTINUED | OUTPATIENT
Start: 2019-03-14 | End: 2019-03-14 | Stop reason: SDUPTHER

## 2019-03-14 RX ORDER — SODIUM CHLORIDE 0.9 % (FLUSH) 0.9 %
10 SYRINGE (ML) INJECTION PRN
Status: DISCONTINUED | OUTPATIENT
Start: 2019-03-14 | End: 2019-03-14 | Stop reason: HOSPADM

## 2019-03-14 RX ORDER — SODIUM CHLORIDE 0.9 % (FLUSH) 0.9 %
10 SYRINGE (ML) INJECTION EVERY 12 HOURS SCHEDULED
Status: DISCONTINUED | OUTPATIENT
Start: 2019-03-14 | End: 2019-03-14 | Stop reason: HOSPADM

## 2019-03-14 RX ORDER — DEXAMETHASONE SODIUM PHOSPHATE 4 MG/ML
INJECTION, SOLUTION INTRA-ARTICULAR; INTRALESIONAL; INTRAMUSCULAR; INTRAVENOUS; SOFT TISSUE PRN
Status: DISCONTINUED | OUTPATIENT
Start: 2019-03-14 | End: 2019-03-14 | Stop reason: SDUPTHER

## 2019-03-14 RX ORDER — FENTANYL CITRATE 50 UG/ML
INJECTION, SOLUTION INTRAMUSCULAR; INTRAVENOUS PRN
Status: DISCONTINUED | OUTPATIENT
Start: 2019-03-14 | End: 2019-03-14 | Stop reason: SDUPTHER

## 2019-03-14 RX ORDER — CIPROFLOXACIN AND DEXAMETHASONE 3; 1 MG/ML; MG/ML
3 SUSPENSION/ DROPS AURICULAR (OTIC) 3 TIMES DAILY
Qty: 1 BOTTLE | Refills: 3 | Status: SHIPPED | OUTPATIENT
Start: 2019-03-14 | End: 2019-03-21

## 2019-03-14 RX ADMIN — FENTANYL CITRATE 50 MCG: 50 INJECTION, SOLUTION INTRAMUSCULAR; INTRAVENOUS at 09:43

## 2019-03-14 RX ADMIN — SODIUM CHLORIDE, POTASSIUM CHLORIDE, SODIUM LACTATE AND CALCIUM CHLORIDE: 600; 310; 30; 20 INJECTION, SOLUTION INTRAVENOUS at 09:40

## 2019-03-14 RX ADMIN — DEXAMETHASONE SODIUM PHOSPHATE 8 MG: 4 INJECTION, SOLUTION INTRAMUSCULAR; INTRAVENOUS at 09:47

## 2019-03-14 RX ADMIN — ONDANSETRON HYDROCHLORIDE 4 MG: 2 INJECTION, SOLUTION INTRAMUSCULAR; INTRAVENOUS at 09:47

## 2019-03-14 RX ADMIN — CLINDAMYCIN PHOSPHATE 900 MG: 150 INJECTION, SOLUTION INTRAVENOUS at 09:41

## 2019-03-14 RX ADMIN — PROPOFOL 150 MG: 10 INJECTION, EMULSION INTRAVENOUS at 09:43

## 2019-03-14 ASSESSMENT — PULMONARY FUNCTION TESTS
PIF_VALUE: 22
PIF_VALUE: 25
PIF_VALUE: 22
PIF_VALUE: 1
PIF_VALUE: 3
PIF_VALUE: 22
PIF_VALUE: 21
PIF_VALUE: 23
PIF_VALUE: 25
PIF_VALUE: 21
PIF_VALUE: 1
PIF_VALUE: 2
PIF_VALUE: 1
PIF_VALUE: 22
PIF_VALUE: 3
PIF_VALUE: 23
PIF_VALUE: 1
PIF_VALUE: 21
PIF_VALUE: 4
PIF_VALUE: 19

## 2019-03-14 ASSESSMENT — PAIN SCALES - GENERAL: PAINLEVEL_OUTOF10: 0

## 2019-03-18 ENCOUNTER — TELEPHONE (OUTPATIENT)
Dept: NON INVASIVE DIAGNOSTICS | Age: 84
End: 2019-03-18

## 2019-04-11 ENCOUNTER — TELEPHONE (OUTPATIENT)
Dept: NON INVASIVE DIAGNOSTICS | Age: 84
End: 2019-04-11

## 2019-04-11 ENCOUNTER — NURSE ONLY (OUTPATIENT)
Dept: NON INVASIVE DIAGNOSTICS | Age: 84
End: 2019-04-11
Payer: MEDICARE

## 2019-04-11 DIAGNOSIS — Z95.818 STATUS POST PLACEMENT OF IMPLANTABLE LOOP RECORDER: Primary | ICD-10-CM

## 2019-04-11 DIAGNOSIS — G45.9 TRANSIENT CEREBRAL ISCHEMIA, UNSPECIFIED TYPE: ICD-10-CM

## 2019-04-11 PROCEDURE — 93298 REM INTERROG DEV EVAL SCRMS: CPT | Performed by: INTERNAL MEDICINE

## 2019-04-11 PROCEDURE — 93299 PR REM INTERROG ICPMS/SCRMS <30 D TECH REVIEW: CPT | Performed by: INTERNAL MEDICINE

## 2019-04-12 NOTE — PROGRESS NOTES
See [employer] [place] report. Remote monitoring reviewed over a 30 day period. End of 30 day monitoring period date of service 4-11-19.

## 2019-04-15 ENCOUNTER — TELEPHONE (OUTPATIENT)
Dept: CARDIOLOGY CLINIC | Age: 84
End: 2019-04-15

## 2019-04-15 NOTE — TELEPHONE ENCOUNTER
Daughter notified that remote transmission was received - Dr Jae Funes will discuss battery Longevity with family at next [address]. Next transmission date of 5-13-19 given. Keep August OV with Dr Jae Funes.

## 2019-04-15 NOTE — TELEPHONE ENCOUNTER
----- Message from Yolanda Sahu DO sent at 4/12/2019 12:59 PM EDT -----  No problem--we can see him at his next OV. ALK  ----- Message -----  From: Frandy Hutchinson RN  Sent: 4/12/2019  10:53 AM  To: Yolanda Sahu DO    FYI - battery RRT 3-27-19 - next OV with Dr Jennifer Henriquez 8-20-19.

## 2019-05-13 ENCOUNTER — NURSE ONLY (OUTPATIENT)
Dept: NON INVASIVE DIAGNOSTICS | Age: 84
End: 2019-05-13
Payer: MEDICARE

## 2019-05-13 DIAGNOSIS — Z95.818 STATUS POST PLACEMENT OF IMPLANTABLE LOOP RECORDER: Primary | ICD-10-CM

## 2019-05-13 DIAGNOSIS — R55 NEAR SYNCOPE: ICD-10-CM

## 2019-05-13 PROCEDURE — 93298 REM INTERROG DEV EVAL SCRMS: CPT | Performed by: INTERNAL MEDICINE

## 2019-05-13 PROCEDURE — 93299 PR REM INTERROG ICPMS/SCRMS <30 D TECH REVIEW: CPT | Performed by: INTERNAL MEDICINE

## 2019-05-13 NOTE — PROGRESS NOTES
See [employer] [place] report. Remote monitoring reviewed over a 30 day period. End of 30 day monitoring period date of service 5-13-19.

## 2019-06-13 ENCOUNTER — NURSE ONLY (OUTPATIENT)
Dept: NON INVASIVE DIAGNOSTICS | Age: 84
End: 2019-06-13
Payer: MEDICARE

## 2019-06-13 DIAGNOSIS — Z95.818 STATUS POST PLACEMENT OF IMPLANTABLE LOOP RECORDER: Primary | ICD-10-CM

## 2019-06-13 DIAGNOSIS — R55 NEAR SYNCOPE: ICD-10-CM

## 2019-06-13 PROCEDURE — 93298 REM INTERROG DEV EVAL SCRMS: CPT | Performed by: INTERNAL MEDICINE

## 2019-06-13 PROCEDURE — 93299 PR REM INTERROG ICPMS/SCRMS <30 D TECH REVIEW: CPT | Performed by: INTERNAL MEDICINE

## 2019-06-17 ENCOUNTER — TELEPHONE (OUTPATIENT)
Dept: NON INVASIVE DIAGNOSTICS | Age: 84
End: 2019-06-17

## 2019-06-17 NOTE — TELEPHONE ENCOUNTER
We have received your remote transmission. Our staff will contact you if there is anything that needs to be discussed. Your next appointment is July 15, 2019 for remote transmission. Patient's daughter verbalized understanding.

## 2019-07-15 ENCOUNTER — NURSE ONLY (OUTPATIENT)
Dept: NON INVASIVE DIAGNOSTICS | Age: 84
End: 2019-07-15
Payer: MEDICARE

## 2019-07-15 ENCOUNTER — TELEPHONE (OUTPATIENT)
Dept: NON INVASIVE DIAGNOSTICS | Age: 84
End: 2019-07-15

## 2019-07-15 DIAGNOSIS — Z95.818 STATUS POST PLACEMENT OF IMPLANTABLE LOOP RECORDER: Primary | ICD-10-CM

## 2019-07-15 DIAGNOSIS — R55 SYNCOPE, UNSPECIFIED SYNCOPE TYPE: ICD-10-CM

## 2019-07-15 PROCEDURE — 93298 REM INTERROG DEV EVAL SCRMS: CPT | Performed by: INTERNAL MEDICINE

## 2019-07-15 PROCEDURE — 93299 PR REM INTERROG ICPMS/SCRMS <30 D TECH REVIEW: CPT | Performed by: INTERNAL MEDICINE

## 2019-07-29 ENCOUNTER — APPOINTMENT (OUTPATIENT)
Dept: GENERAL RADIOLOGY | Age: 84
DRG: 163 | End: 2019-07-29
Payer: MEDICARE

## 2019-07-29 ENCOUNTER — HOSPITAL ENCOUNTER (INPATIENT)
Age: 84
LOS: 7 days | Discharge: NURSING, REHAB OR LONG-TERM CARE | DRG: 163 | End: 2019-08-05
Attending: EMERGENCY MEDICINE | Admitting: FAMILY MEDICINE
Payer: MEDICARE

## 2019-07-29 ENCOUNTER — APPOINTMENT (OUTPATIENT)
Dept: CT IMAGING | Age: 84
DRG: 163 | End: 2019-07-29
Payer: MEDICARE

## 2019-07-29 DIAGNOSIS — J90 PLEURAL EFFUSION, RIGHT: Primary | ICD-10-CM

## 2019-07-29 DIAGNOSIS — R06.09 DYSPNEA ON EXERTION: ICD-10-CM

## 2019-07-29 LAB
ALBUMIN SERPL-MCNC: 3.3 G/DL (ref 3.5–5.2)
ALP BLD-CCNC: 71 U/L (ref 40–129)
ALT SERPL-CCNC: 14 U/L (ref 0–40)
ANION GAP SERPL CALCULATED.3IONS-SCNC: 11 MMOL/L (ref 7–16)
AST SERPL-CCNC: 18 U/L (ref 0–39)
BASOPHILS ABSOLUTE: 0.03 E9/L (ref 0–0.2)
BASOPHILS RELATIVE PERCENT: 0.5 % (ref 0–2)
BILIRUB SERPL-MCNC: 0.5 MG/DL (ref 0–1.2)
BUN BLDV-MCNC: 14 MG/DL (ref 8–23)
CALCIUM SERPL-MCNC: 8.9 MG/DL (ref 8.6–10.2)
CHLORIDE BLD-SCNC: 100 MMOL/L (ref 98–107)
CO2: 28 MMOL/L (ref 22–29)
CREAT SERPL-MCNC: 1.1 MG/DL (ref 0.7–1.2)
EOSINOPHILS ABSOLUTE: 0.1 E9/L (ref 0.05–0.5)
EOSINOPHILS RELATIVE PERCENT: 1.7 % (ref 0–6)
GFR AFRICAN AMERICAN: >60
GFR NON-AFRICAN AMERICAN: >60 ML/MIN/1.73
GLUCOSE BLD-MCNC: 136 MG/DL (ref 74–99)
HCT VFR BLD CALC: 34.6 % (ref 37–54)
HEMOGLOBIN: 11.6 G/DL (ref 12.5–16.5)
IMMATURE GRANULOCYTES #: 0.02 E9/L
IMMATURE GRANULOCYTES %: 0.3 % (ref 0–5)
LACTIC ACID: 1.1 MMOL/L (ref 0.5–2.2)
LIPASE: 9 U/L (ref 13–60)
LYMPHOCYTES ABSOLUTE: 0.72 E9/L (ref 1.5–4)
LYMPHOCYTES RELATIVE PERCENT: 12 % (ref 20–42)
MCH RBC QN AUTO: 31.4 PG (ref 26–35)
MCHC RBC AUTO-ENTMCNC: 33.5 % (ref 32–34.5)
MCV RBC AUTO: 93.5 FL (ref 80–99.9)
MONOCYTES ABSOLUTE: 0.99 E9/L (ref 0.1–0.95)
MONOCYTES RELATIVE PERCENT: 16.6 % (ref 2–12)
NEUTROPHILS ABSOLUTE: 4.12 E9/L (ref 1.8–7.3)
NEUTROPHILS RELATIVE PERCENT: 68.9 % (ref 43–80)
PDW BLD-RTO: 12.3 FL (ref 11.5–15)
PLATELET # BLD: 209 E9/L (ref 130–450)
PMV BLD AUTO: 9.4 FL (ref 7–12)
POTASSIUM SERPL-SCNC: 4.6 MMOL/L (ref 3.5–5)
PRO-BNP: 3348 PG/ML (ref 0–450)
RBC # BLD: 3.7 E12/L (ref 3.8–5.8)
SODIUM BLD-SCNC: 139 MMOL/L (ref 132–146)
TOTAL PROTEIN: 6.4 G/DL (ref 6.4–8.3)
TROPONIN: 0.02 NG/ML (ref 0–0.03)
WBC # BLD: 6 E9/L (ref 4.5–11.5)

## 2019-07-29 PROCEDURE — 70450 CT HEAD/BRAIN W/O DYE: CPT

## 2019-07-29 PROCEDURE — 85025 COMPLETE CBC W/AUTO DIFF WBC: CPT

## 2019-07-29 PROCEDURE — 83690 ASSAY OF LIPASE: CPT

## 2019-07-29 PROCEDURE — 36415 COLL VENOUS BLD VENIPUNCTURE: CPT

## 2019-07-29 PROCEDURE — 71046 X-RAY EXAM CHEST 2 VIEWS: CPT

## 2019-07-29 PROCEDURE — 80053 COMPREHEN METABOLIC PANEL: CPT

## 2019-07-29 PROCEDURE — 6360000004 HC RX CONTRAST MEDICATION: Performed by: RADIOLOGY

## 2019-07-29 PROCEDURE — 2580000003 HC RX 258: Performed by: RADIOLOGY

## 2019-07-29 PROCEDURE — 83605 ASSAY OF LACTIC ACID: CPT

## 2019-07-29 PROCEDURE — 6360000002 HC RX W HCPCS: Performed by: FAMILY MEDICINE

## 2019-07-29 PROCEDURE — 2140000000 HC CCU INTERMEDIATE R&B

## 2019-07-29 PROCEDURE — 71275 CT ANGIOGRAPHY CHEST: CPT

## 2019-07-29 PROCEDURE — 83880 ASSAY OF NATRIURETIC PEPTIDE: CPT

## 2019-07-29 PROCEDURE — 84484 ASSAY OF TROPONIN QUANT: CPT

## 2019-07-29 PROCEDURE — 99285 EMERGENCY DEPT VISIT HI MDM: CPT

## 2019-07-29 PROCEDURE — 93005 ELECTROCARDIOGRAM TRACING: CPT | Performed by: NURSE PRACTITIONER

## 2019-07-29 RX ORDER — TAMSULOSIN HYDROCHLORIDE 0.4 MG/1
0.4 CAPSULE ORAL DAILY
Status: DISCONTINUED | OUTPATIENT
Start: 2019-07-30 | End: 2019-08-05 | Stop reason: HOSPADM

## 2019-07-29 RX ORDER — FUROSEMIDE 40 MG/1
40 TABLET ORAL DAILY
Status: DISCONTINUED | OUTPATIENT
Start: 2019-07-30 | End: 2019-08-05 | Stop reason: HOSPADM

## 2019-07-29 RX ORDER — LISINOPRIL 20 MG/1
20 TABLET ORAL DAILY
Status: DISCONTINUED | OUTPATIENT
Start: 2019-07-30 | End: 2019-08-05 | Stop reason: HOSPADM

## 2019-07-29 RX ORDER — SODIUM CHLORIDE 0.9 % (FLUSH) 0.9 %
10 SYRINGE (ML) INJECTION
Status: COMPLETED | OUTPATIENT
Start: 2019-07-29 | End: 2019-07-29

## 2019-07-29 RX ORDER — CALCIUM CARBONATE 500(1250)
500 TABLET ORAL DAILY
Status: DISCONTINUED | OUTPATIENT
Start: 2019-07-30 | End: 2019-08-05 | Stop reason: HOSPADM

## 2019-07-29 RX ORDER — OXYBUTYNIN CHLORIDE 5 MG/1
5 TABLET, EXTENDED RELEASE ORAL DAILY
Status: DISCONTINUED | OUTPATIENT
Start: 2019-07-30 | End: 2019-08-05 | Stop reason: HOSPADM

## 2019-07-29 RX ORDER — FUROSEMIDE 10 MG/ML
40 INJECTION INTRAMUSCULAR; INTRAVENOUS ONCE
Status: COMPLETED | OUTPATIENT
Start: 2019-07-29 | End: 2019-07-29

## 2019-07-29 RX ORDER — PRIMIDONE 50 MG/1
50 TABLET ORAL 2 TIMES DAILY
Status: DISCONTINUED | OUTPATIENT
Start: 2019-07-29 | End: 2019-08-05 | Stop reason: HOSPADM

## 2019-07-29 RX ADMIN — IOPAMIDOL 70 ML: 755 INJECTION, SOLUTION INTRAVENOUS at 17:35

## 2019-07-29 RX ADMIN — Medication 10 ML: at 17:35

## 2019-07-29 RX ADMIN — FUROSEMIDE 40 MG: 10 INJECTION, SOLUTION INTRAMUSCULAR; INTRAVENOUS at 22:31

## 2019-07-29 ASSESSMENT — ENCOUNTER SYMPTOMS
VOMITING: 0
WHEEZING: 0
NAUSEA: 0
ABDOMINAL PAIN: 0
HEARTBURN: 0
COUGH: 0
BACK PAIN: 0
PHOTOPHOBIA: 0
SHORTNESS OF BREATH: 1
CHEST TIGHTNESS: 0

## 2019-07-29 ASSESSMENT — PAIN DESCRIPTION - ORIENTATION: ORIENTATION: RIGHT

## 2019-07-29 ASSESSMENT — PAIN DESCRIPTION - PROGRESSION: CLINICAL_PROGRESSION: GRADUALLY WORSENING

## 2019-07-29 ASSESSMENT — PAIN DESCRIPTION - PAIN TYPE: TYPE: ACUTE PAIN

## 2019-07-29 ASSESSMENT — PAIN SCALES - WONG BAKER: WONGBAKER_NUMERICALRESPONSE: 8

## 2019-07-29 ASSESSMENT — PAIN DESCRIPTION - FREQUENCY: FREQUENCY: INTERMITTENT

## 2019-07-29 ASSESSMENT — PAIN DESCRIPTION - DESCRIPTORS: DESCRIPTORS: ACHING

## 2019-07-29 NOTE — ED PROVIDER NOTES
Patient is a 80-year-old male that presents to the emergency department for evaluation of right-sided chest wall pain, gait problem and fatigue. For the last week he started having right-sided chest wall pain. It is described as a sharp stabbing pain worse with movement or inspiration. Pain does not radiate anywhere. Admits to shortness of breath on exertion. No medications taken prior to arrival.  Denies cough, congestion, cold-like symptoms. States he is never had this pain before. Denies any trauma. Visions daughter states that she has noticed these been a little more unsteady recently while walking. He has not fallen due to the unsteady gait though. Denies fever, chills, nausea, vomiting, abdominal pain, change in bowel habits, change in urinary habits. The history is provided by the patient, a relative and medical records. Chest Pain   Pain location:  R chest  Pain quality: stabbing    Pain radiates to:  Does not radiate  Pain severity:  Moderate  Onset quality:  Sudden  Duration:  1 week  Timing:  Intermittent  Progression:  Waxing and waning  Chronicity:  New  Context: movement    Relieved by:  Nothing  Worsened by:  Coughing, movement and deep breathing  Ineffective treatments:  None tried  Associated symptoms: fatigue, shortness of breath and weakness    Associated symptoms: no abdominal pain, no altered mental status, no anorexia, no anxiety, no back pain, no cough, no diaphoresis, no dizziness, no fever, no headache, no heartburn, no lower extremity edema, no nausea, no near-syncope, no palpitations, no syncope and no vomiting    Risk factors: male sex    Risk factors: not obese, no prior DVT/PE and no surgery        Review of Systems   Constitutional: Positive for fatigue. Negative for chills, diaphoresis and fever. Eyes: Negative for photophobia and visual disturbance. Respiratory: Positive for shortness of breath. Negative for cough, chest tightness and wheezing.     Cardiovascular:

## 2019-07-30 ENCOUNTER — APPOINTMENT (OUTPATIENT)
Dept: GENERAL RADIOLOGY | Age: 84
DRG: 163 | End: 2019-07-30
Payer: MEDICARE

## 2019-07-30 ENCOUNTER — APPOINTMENT (OUTPATIENT)
Dept: ULTRASOUND IMAGING | Age: 84
DRG: 163 | End: 2019-07-30
Payer: MEDICARE

## 2019-07-30 LAB
ALBUMIN SERPL-MCNC: 3.3 G/DL (ref 3.5–5.2)
ALP BLD-CCNC: 72 U/L (ref 40–129)
ALT SERPL-CCNC: 14 U/L (ref 0–40)
AMORPHOUS: ABNORMAL
AMYLASE: 22 U/L (ref 20–100)
ANION GAP SERPL CALCULATED.3IONS-SCNC: 15 MMOL/L (ref 7–16)
APPEARANCE FLUID: NORMAL
AST SERPL-CCNC: 19 U/L (ref 0–39)
BACTERIA: ABNORMAL /HPF
BILIRUB SERPL-MCNC: 0.6 MG/DL (ref 0–1.2)
BILIRUBIN DIRECT: 0.2 MG/DL (ref 0–0.3)
BILIRUBIN URINE: NEGATIVE
BILIRUBIN, INDIRECT: 0.4 MG/DL (ref 0–1)
BLOOD, URINE: ABNORMAL
BUN BLDV-MCNC: 15 MG/DL (ref 8–23)
CA 125: 15.3 U/ML (ref 0–35)
CALCIUM SERPL-MCNC: 8.6 MG/DL (ref 8.6–10.2)
CEA: 0.7 NG/ML (ref 0–5.2)
CELL COUNT FLUID TYPE: NORMAL
CHLORIDE BLD-SCNC: 100 MMOL/L (ref 98–107)
CLARITY: CLEAR
CO2: 25 MMOL/L (ref 22–29)
COLOR FLUID: NORMAL
COLOR: YELLOW
CREAT SERPL-MCNC: 1 MG/DL (ref 0.7–1.2)
CRITICAL: NORMAL
DATE ANALYZED: NORMAL
DATE OF COLLECTION: NORMAL
EKG ATRIAL RATE: 82 BPM
EKG P-R INTERVAL: 214 MS
EKG Q-T INTERVAL: 382 MS
EKG QRS DURATION: 152 MS
EKG QTC CALCULATION (BAZETT): 446 MS
EKG R AXIS: -66 DEGREES
EKG T AXIS: 44 DEGREES
EKG VENTRICULAR RATE: 82 BPM
EPITHELIAL CELLS, UA: ABNORMAL /HPF
FLUID TYPE: NORMAL
FLUID TYPE: NORMAL
GFR AFRICAN AMERICAN: >60
GFR NON-AFRICAN AMERICAN: >60 ML/MIN/1.73
GLUCOSE BLD-MCNC: 112 MG/DL (ref 74–99)
GLUCOSE URINE: NEGATIVE MG/DL
GLUCOSE, FLUID: 76 MG/DL
HCT VFR BLD CALC: 34.9 % (ref 37–54)
HEMATOCRIT FLUID: <2 %
HEMOGLOBIN: 11.9 G/DL (ref 12.5–16.5)
KETONES, URINE: NEGATIVE MG/DL
LAB: NORMAL
LD, FLUID: 1169 U/L
LEUKOCYTE ESTERASE, URINE: NEGATIVE
LIPASE: 9 U/L (ref 13–60)
Lab: NORMAL
MCH RBC QN AUTO: 31.1 PG (ref 26–35)
MCHC RBC AUTO-ENTMCNC: 34.1 % (ref 32–34.5)
MCV RBC AUTO: 91.1 FL (ref 80–99.9)
MONOCYTE, FLUID: 44 %
NEUTROPHIL, FLUID: 56 %
NITRITE, URINE: NEGATIVE
NUCLEATED CELLS FLUID: 1729 /UL
OPERATOR ID: NORMAL
PDW BLD-RTO: 12.3 FL (ref 11.5–15)
PH FLUID: 7.45
PH UA: 5.5 (ref 5–9)
PH, BODY FLUID: 7.49
PLATELET # BLD: 212 E9/L (ref 130–450)
PMV BLD AUTO: 9.8 FL (ref 7–12)
POTASSIUM SERPL-SCNC: 4 MMOL/L (ref 3.5–5)
PROSTATE SPECIFIC ANTIGEN: 9.7 NG/ML (ref 0–4)
PROTEIN FLUID: 4 G/DL
PROTEIN UA: ABNORMAL MG/DL
RBC # BLD: 3.83 E12/L (ref 3.8–5.8)
RBC FLUID: NORMAL /UL
RBC UA: ABNORMAL /HPF (ref 0–2)
SODIUM BLD-SCNC: 140 MMOL/L (ref 132–146)
SOURCE, BLOOD GAS: NORMAL
SPECIFIC GRAVITY UA: 1.01 (ref 1–1.03)
TIME ANALYZED: 1117
TOTAL PROTEIN: 6.9 G/DL (ref 6.4–8.3)
UROBILINOGEN, URINE: 0.2 E.U./DL
WBC # BLD: 6.3 E9/L (ref 4.5–11.5)
WBC UA: ABNORMAL /HPF (ref 0–5)

## 2019-07-30 PROCEDURE — 83690 ASSAY OF LIPASE: CPT

## 2019-07-30 PROCEDURE — 83986 ASSAY PH BODY FLUID NOS: CPT

## 2019-07-30 PROCEDURE — 85027 COMPLETE CBC AUTOMATED: CPT

## 2019-07-30 PROCEDURE — 89051 BODY FLUID CELL COUNT: CPT

## 2019-07-30 PROCEDURE — 36415 COLL VENOUS BLD VENIPUNCTURE: CPT

## 2019-07-30 PROCEDURE — 88305 TISSUE EXAM BY PATHOLOGIST: CPT

## 2019-07-30 PROCEDURE — 87205 SMEAR GRAM STAIN: CPT

## 2019-07-30 PROCEDURE — G0103 PSA SCREENING: HCPCS

## 2019-07-30 PROCEDURE — 84157 ASSAY OF PROTEIN OTHER: CPT

## 2019-07-30 PROCEDURE — 0W993ZX DRAINAGE OF RIGHT PLEURAL CAVITY, PERCUTANEOUS APPROACH, DIAGNOSTIC: ICD-10-PCS | Performed by: INTERNAL MEDICINE

## 2019-07-30 PROCEDURE — 88112 CYTOPATH CELL ENHANCE TECH: CPT

## 2019-07-30 PROCEDURE — 80048 BASIC METABOLIC PNL TOTAL CA: CPT

## 2019-07-30 PROCEDURE — 6370000000 HC RX 637 (ALT 250 FOR IP): Performed by: FAMILY MEDICINE

## 2019-07-30 PROCEDURE — 83615 LACTATE (LD) (LDH) ENZYME: CPT

## 2019-07-30 PROCEDURE — 97161 PT EVAL LOW COMPLEX 20 MIN: CPT | Performed by: PHYSICAL THERAPIST

## 2019-07-30 PROCEDURE — 97165 OT EVAL LOW COMPLEX 30 MIN: CPT

## 2019-07-30 PROCEDURE — 71045 X-RAY EXAM CHEST 1 VIEW: CPT

## 2019-07-30 PROCEDURE — 86301 IMMUNOASSAY TUMOR CA 19-9: CPT

## 2019-07-30 PROCEDURE — 93308 TTE F-UP OR LMTD: CPT

## 2019-07-30 PROCEDURE — 87088 URINE BACTERIA CULTURE: CPT

## 2019-07-30 PROCEDURE — 97530 THERAPEUTIC ACTIVITIES: CPT | Performed by: PHYSICAL THERAPIST

## 2019-07-30 PROCEDURE — 32555 ASPIRATE PLEURA W/ IMAGING: CPT

## 2019-07-30 PROCEDURE — 2140000000 HC CCU INTERMEDIATE R&B

## 2019-07-30 PROCEDURE — 82947 ASSAY GLUCOSE BLOOD QUANT: CPT

## 2019-07-30 PROCEDURE — 81001 URINALYSIS AUTO W/SCOPE: CPT

## 2019-07-30 PROCEDURE — 82150 ASSAY OF AMYLASE: CPT

## 2019-07-30 PROCEDURE — 85013 SPUN MICROHEMATOCRIT: CPT

## 2019-07-30 PROCEDURE — 97535 SELF CARE MNGMENT TRAINING: CPT

## 2019-07-30 PROCEDURE — 93010 ELECTROCARDIOGRAM REPORT: CPT | Performed by: INTERNAL MEDICINE

## 2019-07-30 PROCEDURE — APPSS45 APP SPLIT SHARED TIME 31-45 MINUTES: Performed by: NURSE PRACTITIONER

## 2019-07-30 PROCEDURE — 87070 CULTURE OTHR SPECIMN AEROBIC: CPT

## 2019-07-30 PROCEDURE — 82378 CARCINOEMBRYONIC ANTIGEN: CPT

## 2019-07-30 PROCEDURE — 86304 IMMUNOASSAY TUMOR CA 125: CPT

## 2019-07-30 PROCEDURE — 99222 1ST HOSP IP/OBS MODERATE 55: CPT | Performed by: INTERNAL MEDICINE

## 2019-07-30 PROCEDURE — 80076 HEPATIC FUNCTION PANEL: CPT

## 2019-07-30 RX ADMIN — PRIMIDONE 50 MG: 50 TABLET ORAL at 20:10

## 2019-07-30 RX ADMIN — FUROSEMIDE 40 MG: 40 TABLET ORAL at 08:58

## 2019-07-30 RX ADMIN — PRIMIDONE 50 MG: 50 TABLET ORAL at 08:58

## 2019-07-30 RX ADMIN — LISINOPRIL 20 MG: 20 TABLET ORAL at 08:58

## 2019-07-30 RX ADMIN — TAMSULOSIN HYDROCHLORIDE 0.4 MG: 0.4 CAPSULE ORAL at 08:57

## 2019-07-30 RX ADMIN — CALCIUM 500 MG: 500 TABLET ORAL at 08:58

## 2019-07-30 RX ADMIN — OXYBUTYNIN CHLORIDE 5 MG: 5 TABLET, EXTENDED RELEASE ORAL at 08:58

## 2019-07-30 ASSESSMENT — PAIN SCALES - GENERAL
PAINLEVEL_OUTOF10: 0
PAINLEVEL_OUTOF10: 0

## 2019-07-30 NOTE — PROGRESS NOTES
potential is Good for reaching above PT goals. Pts/ family goals   1. None stated. Patient and or family understand(s) diagnosis, prognosis, and plan of care. No     PLAN  PT care will be provided in accordance with the objectives noted above. Whenever appropriate, clear delegation orders will be provided for nursing staff. Exercises and functional mobility practice will be used as well as appropriate assistive devices or modalities to obtain goals. Patient and family education will also be administered as needed. Frequency of treatments will be 2-5x/week x 4-5 days.     Time in: 1409  Time out: 1425  Evaluation + 8  minutes tx time    Laura Martinez PT, DPT   License number:  [id number]

## 2019-07-31 ENCOUNTER — APPOINTMENT (OUTPATIENT)
Dept: GENERAL RADIOLOGY | Age: 84
DRG: 163 | End: 2019-07-31
Payer: MEDICARE

## 2019-07-31 ENCOUNTER — APPOINTMENT (OUTPATIENT)
Dept: CT IMAGING | Age: 84
DRG: 163 | End: 2019-07-31
Payer: MEDICARE

## 2019-07-31 LAB
ANION GAP SERPL CALCULATED.3IONS-SCNC: 16 MMOL/L (ref 7–16)
BUN BLDV-MCNC: 21 MG/DL (ref 8–23)
CALCIUM SERPL-MCNC: 8.8 MG/DL (ref 8.6–10.2)
CHLORIDE BLD-SCNC: 98 MMOL/L (ref 98–107)
CO2: 24 MMOL/L (ref 22–29)
CREAT SERPL-MCNC: 1.1 MG/DL (ref 0.7–1.2)
GFR AFRICAN AMERICAN: >60
GFR NON-AFRICAN AMERICAN: >60 ML/MIN/1.73
GLUCOSE BLD-MCNC: 120 MG/DL (ref 74–99)
GRAM STAIN ORDERABLE: NORMAL
HCT VFR BLD CALC: 36.6 % (ref 37–54)
HEMOGLOBIN: 12.4 G/DL (ref 12.5–16.5)
LACTATE DEHYDROGENASE: 185 U/L (ref 135–225)
MCH RBC QN AUTO: 30.9 PG (ref 26–35)
MCHC RBC AUTO-ENTMCNC: 33.9 % (ref 32–34.5)
MCV RBC AUTO: 91.3 FL (ref 80–99.9)
METER GLUCOSE: 129 MG/DL (ref 74–99)
PDW BLD-RTO: 12.3 FL (ref 11.5–15)
PLATELET # BLD: 232 E9/L (ref 130–450)
PMV BLD AUTO: 10 FL (ref 7–12)
POTASSIUM SERPL-SCNC: 3.8 MMOL/L (ref 3.5–5)
RBC # BLD: 4.01 E12/L (ref 3.8–5.8)
SODIUM BLD-SCNC: 138 MMOL/L (ref 132–146)
WBC # BLD: 7.5 E9/L (ref 4.5–11.5)

## 2019-07-31 PROCEDURE — 82962 GLUCOSE BLOOD TEST: CPT

## 2019-07-31 PROCEDURE — 71250 CT THORAX DX C-: CPT

## 2019-07-31 PROCEDURE — 74176 CT ABD & PELVIS W/O CONTRAST: CPT

## 2019-07-31 PROCEDURE — 6370000000 HC RX 637 (ALT 250 FOR IP): Performed by: FAMILY MEDICINE

## 2019-07-31 PROCEDURE — 2140000000 HC CCU INTERMEDIATE R&B

## 2019-07-31 PROCEDURE — 6370000000 HC RX 637 (ALT 250 FOR IP): Performed by: INTERNAL MEDICINE

## 2019-07-31 PROCEDURE — 83615 LACTATE (LD) (LDH) ENZYME: CPT

## 2019-07-31 PROCEDURE — 97530 THERAPEUTIC ACTIVITIES: CPT

## 2019-07-31 PROCEDURE — 85027 COMPLETE CBC AUTOMATED: CPT

## 2019-07-31 PROCEDURE — 80048 BASIC METABOLIC PNL TOTAL CA: CPT

## 2019-07-31 PROCEDURE — 99232 SBSQ HOSP IP/OBS MODERATE 35: CPT | Performed by: INTERNAL MEDICINE

## 2019-07-31 PROCEDURE — 6370000000 HC RX 637 (ALT 250 FOR IP)

## 2019-07-31 PROCEDURE — 87040 BLOOD CULTURE FOR BACTERIA: CPT

## 2019-07-31 PROCEDURE — 36415 COLL VENOUS BLD VENIPUNCTURE: CPT

## 2019-07-31 PROCEDURE — 6360000004 HC RX CONTRAST MEDICATION: Performed by: RADIOLOGY

## 2019-07-31 RX ORDER — ACETAMINOPHEN 325 MG/1
TABLET ORAL
Status: COMPLETED
Start: 2019-07-31 | End: 2019-07-31

## 2019-07-31 RX ORDER — ACETAMINOPHEN 325 MG/1
650 TABLET ORAL EVERY 6 HOURS PRN
Status: DISCONTINUED | OUTPATIENT
Start: 2019-07-31 | End: 2019-08-05 | Stop reason: HOSPADM

## 2019-07-31 RX ADMIN — CALCIUM 500 MG: 500 TABLET ORAL at 08:28

## 2019-07-31 RX ADMIN — IOHEXOL 50 ML: 240 INJECTION, SOLUTION INTRATHECAL; INTRAVASCULAR; INTRAVENOUS; ORAL at 11:07

## 2019-07-31 RX ADMIN — ACETAMINOPHEN 650 MG: 325 TABLET, FILM COATED ORAL at 01:54

## 2019-07-31 RX ADMIN — PRIMIDONE 50 MG: 50 TABLET ORAL at 08:28

## 2019-07-31 RX ADMIN — FUROSEMIDE 40 MG: 40 TABLET ORAL at 08:28

## 2019-07-31 RX ADMIN — LISINOPRIL 20 MG: 20 TABLET ORAL at 08:28

## 2019-07-31 RX ADMIN — TAMSULOSIN HYDROCHLORIDE 0.4 MG: 0.4 CAPSULE ORAL at 08:28

## 2019-07-31 RX ADMIN — ACETAMINOPHEN 650 MG: 325 TABLET, FILM COATED ORAL at 17:15

## 2019-07-31 RX ADMIN — OXYBUTYNIN CHLORIDE 5 MG: 5 TABLET, EXTENDED RELEASE ORAL at 08:28

## 2019-07-31 RX ADMIN — PRIMIDONE 50 MG: 50 TABLET ORAL at 21:05

## 2019-07-31 ASSESSMENT — PAIN SCALES - GENERAL
PAINLEVEL_OUTOF10: 0
PAINLEVEL_OUTOF10: 2
PAINLEVEL_OUTOF10: 0
PAINLEVEL_OUTOF10: 7
PAINLEVEL_OUTOF10: 0
PAINLEVEL_OUTOF10: 0

## 2019-07-31 ASSESSMENT — PAIN DESCRIPTION - ORIENTATION: ORIENTATION: RIGHT

## 2019-07-31 ASSESSMENT — PAIN DESCRIPTION - PAIN TYPE: TYPE: ACUTE PAIN

## 2019-07-31 ASSESSMENT — PAIN DESCRIPTION - FREQUENCY: FREQUENCY: INTERMITTENT

## 2019-07-31 ASSESSMENT — PAIN DESCRIPTION - LOCATION: LOCATION: ABDOMEN

## 2019-07-31 ASSESSMENT — PAIN DESCRIPTION - DESCRIPTORS: DESCRIPTORS: DULL;DISCOMFORT

## 2019-07-31 NOTE — PROGRESS NOTES
Amanda Kaufman M.D.,[facility]  Hunter Phalen, D.O., FADINOJHONATHAN., Alek Gooden M.D. Nikki Cooper M.D., Segundo Jones M.D. Daily Pulmonary Progress Note    Patient:  Carlo Stevens 80 y.o. male MRN: [id number]     Date of Service: 7/31/2019      Synopsis     We are following patient for right pleural effusion, status post thoracentesis    \"CC\" SOB     Code status: Prior    Subjective      Patient was seen and examined. RRT overnight for fever 101.7, pulse 103, pulse oximetry 91% on room air. He is status post right thoracentesis yesterday, pleural fluid possible  empyema, CT of chest today for further evaluation. He is laying in bed in NAD , no labored breathing now on 2 LNC , sat 98% . Denies SOB, cough or pain this am . No family at bedside. Review of Systems:  Constitutional: Denies fever, weight loss, night sweats, and fatigue  Skin: Denies pigmentation, dark lesions, and rashes   HEENT: Denies hearing loss, tinnitus, ear drainage, epistaxis, sore throat, and hoarseness. Cardiovascular: Denies palpitations, chest pain, and chest pressure. Respiratory: Denies cough, dyspnea at rest, hemoptysis, apnea, and choking.   Gastrointestinal: Denies nausea, vomiting, poor appetite, diarrhea, heartburn or reflux  Genitourinary: Denies dysuria, frequency, urgency or hematuria  Musculoskeletal: Denies myalgias, muscle weakness, and bone pain    24-hour events:  S/p right thora     Objective   Vitals: BP (!) 142/65   Pulse 78   Temp 98 °F (36.7 °C) (Axillary)   Resp 16   Ht 5' 10\" (1.778 m)   Wt 140 lb 9.6 oz (63.8 kg)   SpO2 98%   BMI 20.17 kg/m²     I/O:    Intake/Output Summary (Last 24 hours) at 7/31/2019 0927  Last data filed at 7/31/2019 2918  Gross per 24 hour   Intake 360 ml   Output 300 ml   Net 60 ml                        CURRENT MEDS :  Scheduled Meds:   calcium elemental  500 mg Oral Daily    lisinopril  20 mg Oral Daily    oxybutynin  5 mg Oral Daily   

## 2019-07-31 NOTE — PROGRESS NOTES
Perfect Serve sent to Dr. Марина Davis regarding patient fever 101.7 axillary, pulse 103, and pulse ox 91% on room air. Will await orders.

## 2019-07-31 NOTE — PLAN OF CARE
Problem: Falls - Risk of:  Goal: Will remain free from falls  Description  Will remain free from falls  Outcome: Met This Shift     Problem: Pain:  Goal: Pain level will decrease  Description  Pain level will decrease  Outcome: Met This Shift     Problem: Risk for Impaired Skin Integrity  Goal: Tissue integrity - skin and mucous membranes  Description  Structural intactness and normal physiological function of skin and  mucous membranes.   Outcome: Ongoing

## 2019-07-31 NOTE — CARE COORDINATION
SOCIAL WORK/CASEMANAGEMENT TRANSITION OF CARE PLANNING: PT and OT eval done yesterday with [facility] of 17/24 , pt ambulated with fww 100' sba.min assist. The pt wants to return home and not go to [facility]. [facility] is set up and I requested [facility] orders for cpa, medication compliance with PT and OT. Pt had thoracentesis yesterday.  Eli Manley  7/31/2019

## 2019-07-31 NOTE — PROGRESS NOTES
Subjective:    S/p RRT last amanuel for temp and mild tachy? Objective:    BP (!) 142/65   Pulse 78   Temp 98 °F (36.7 °C) (Axillary)   Resp 16   Ht 5' 10\" (1.778 m)   Wt 140 lb 9.6 oz (63.8 kg)   SpO2 98%   BMI 20.17 kg/m²     Gen: AAOx3. No acute distress  HEENT: NCAT, PERRLA, EOMI  Neck: No JVD or bruit  Chest: Symmetric. Nonlabored  Heart:  RRR S1 S2. No sig murmurs, rubs, or gallops  Lungs:  Decreased BS RLL,. No wheezes, rales or rhonchi  Abd: Soft, NT, ND. Positive bowel sounds. No rebound or guarding   Extrem:  No sig edema  Neuro: 2-12 intact. No focal deficits    CBC:   Lab Results   Component Value Date    WBC 7.5 07/31/2019    RBC 4.01 07/31/2019    HGB 12.4 07/31/2019    HCT 36.6 07/31/2019    MCV 91.3 07/31/2019    MCH 30.9 07/31/2019    MCHC 33.9 07/31/2019    RDW 12.3 07/31/2019     07/31/2019    MPV 10.0 07/31/2019     CMP:    Lab Results   Component Value Date     07/31/2019    K 3.8 07/31/2019    CL 98 07/31/2019    CO2 24 07/31/2019    BUN 21 07/31/2019    CREATININE 1.1 07/31/2019    GFRAA >60 07/31/2019    LABGLOM >60 07/31/2019    GLUCOSE 120 07/31/2019    PROT 6.9 07/30/2019    LABALBU 3.3 07/30/2019    CALCIUM 8.8 07/31/2019    BILITOT 0.6 07/30/2019    ALKPHOS 72 07/30/2019    AST 19 07/30/2019    ALT 14 07/30/2019        Assessment:    Patient Active Problem List   Diagnosis    First degree AV block    Essential hypertension    Hyperlipidemia    Orthostatic hypotension    Near syncope    Transient cerebral ischemia    Status post placement of implantable loop recorder    Age-related nuclear cataract of both eyes    Chronic serous otitis media of right ear    Pleural effusion, right       Plan:    Consult ID for Abx eval. Tylenol. PT/OT/SW evals. Await further input Pulm, CTS eval needed?  Await Urology eval for elev PSA and CT scan abd r/o prostate or other malig other tumor markers neg    Bobo Puckett MD, FAAFP  12:21 PM  7/31/2019

## 2019-07-31 NOTE — CONSULTS
[address] [place] [address] [facility]    Consultation Note     Admit Date: 7/29/2019  3:33 PM    Reason for Consult:   Possible empyema     Attending Physician:  Abhilash Aiken MD     Chief Complaint: rt chest pain     HISTORY OF PRESENT ILLNESS:   The patient is a 80 y.o.  man not  known to the Infectious Diseases service. The patient is admitted c rt pleuritic chest pain. He complains of this problem for about 2 weeks and daughter finally brought him in for further eval.  The pt denies fever  But had cough and work up included a thoracentesis after ct showed rt lung nodule and pl fluid. Fluid was bloody and the rest of the parameters are :    Andres Tai (MRN [id number]) as of 7/31/2019 09:43    Ref. Range 7/30/2019 10:50   Source + CELL CT/DIFF-BF Unknown Thoracentesis   Appearance, Fluid Unknown Cloudy   Color, Fluid Unknown Red   Fluid Type Unknown Thoracentesis   Glucose, Fluid Latest Ref Range: Not Established mg/dL     LD, Fluid Latest Ref Range: Not Established U/L 1169   RBC, Fluid Latest Units: /uL 45,000   Monocyte Count, Fluid Latest Units: % 44   Neutrophil Count, Fluid Latest Units: % 56   Nucl Cell, Fluid Latest Units: /uL 1,729   Protein, Fluid Latest Ref Range: Not Established g/dL 4.0   pH, Body Fluid Unknown 7.49   Hematocrit, Fluid Latest Units: % <2.0   Glucose 76    The pt had PSA of 9.7 and has a katharina of PROSTATE Ca.   Up until the last 2 weeks the family who were present during the entire visit said that the pt was ambulatory and did minor chores around the house; he live c his wife   ID ask ot eval for need for atb      Past Medical History:        Diagnosis Date    Arthritis     Bradycardia     hx of / follows with Dr. Ilda Wynne CAD (coronary artery disease)     Cataract     right eye    Depression     with anxiety    Dizziness     as of 7/16/2018 denies any current issues. / has Loop Recorder in place / follows with Dr. Ilda Wynne Fluid level behind

## 2019-07-31 NOTE — SIGNIFICANT EVENT
Rapid Response Team Note  Date of event: 7/31/2019   Time of event: 0146  Jim Churches Wine 80y.o. year old male   YOB: 1928   Admit date:  7/29/2019   Location: 2547/8679-B   Witnessed? : [x]Yes  [] No  Monitored? : [x]Yes  [] No  Code status: [x] Full  [] DNR-CCA  []DNR-CC  ______________________________________________________________________  Reason for RRT:     RRT called for fever and tachycardia  Subjective:   Upon arrival the pt was awake, alert and oriented. Denies any chest pain, SOB. Denies fever or chills. Per nurse patient had a fever of 101.7 and tachycardic with HR of 108 with no antipyretic meds ordered. Objective:   Vital signs: BP: 148/79   /RR: 20   /HR:105     /Temp:  101.5   / O2 Sat: 95%   Repeat Vitals: BP:  158/80    /HR:109      / O2 Sat:94%  · General Appearance: alert, appears stated age and cooperative  · Lung: clear to auscultation bilaterally  · Heart: regular rate and rhythm and tachycardic  · Abdomen: soft, non-tender; bowel sounds normal; no masses,  no organomegaly  · Extremities:  extremities normal, atraumatic, no cyanosis or edema    · Neurologic: Mental status: Alert, oriented, thought content appropriate   NIHSS Score:       Response to pain:   [] Yes  [] No  Follow commands:  [] Yes  [] No  Facial asymmetry:  [] Yes  [] No  Motor strength:  [] Equal  [] Focal deficit:     Diagnostic Test:  Blood Sugar:  129 mg/dL  EKG:      ABG:    No results found for: PH, PCO2, PO2, HCO3, O2SAT    Infusion(s):   [] Normal Saline:  Bolus:       L, Rate:   cc/hr        Teams Assessment and Plan:  ?  Fever with unknown etiology  -Tylenol 650 Q6hr PRN  -Obtain repeat BCx x2  -Repeat CBC w diff  -Monitor off Abx    ?   Disposition:  [x] No transfer   [] Transfer to monitor floor  [] Transfer to: [] MICU [] NICU [] CCU [] SICU        Patients family updated: [] Yes  [x] No   Discussed with:  [] Critical Care Intensivist:         [x] Primary Care Provider: Ghislaine Muniz,

## 2019-08-01 LAB
ABO/RH: NORMAL
ABO/RH: NORMAL
ANION GAP SERPL CALCULATED.3IONS-SCNC: 13 MMOL/L (ref 7–16)
ANTIBODY SCREEN: NORMAL
APTT: 25.3 SEC (ref 24.5–35.1)
BASOPHILS ABSOLUTE: 0.03 E9/L (ref 0–0.2)
BASOPHILS RELATIVE PERCENT: 0.4 % (ref 0–2)
BODY FLUID CULTURE, STERILE: NORMAL
BUN BLDV-MCNC: 27 MG/DL (ref 8–23)
CA 19-9: 7 U/ML (ref 0–37)
CALCIUM SERPL-MCNC: 8.4 MG/DL (ref 8.6–10.2)
CHLORIDE BLD-SCNC: 96 MMOL/L (ref 98–107)
CO2: 26 MMOL/L (ref 22–29)
CREAT SERPL-MCNC: 1.1 MG/DL (ref 0.7–1.2)
EOSINOPHILS ABSOLUTE: 0.12 E9/L (ref 0.05–0.5)
EOSINOPHILS RELATIVE PERCENT: 1.8 % (ref 0–6)
GFR AFRICAN AMERICAN: >60
GFR NON-AFRICAN AMERICAN: >60 ML/MIN/1.73
GLUCOSE BLD-MCNC: 127 MG/DL (ref 74–99)
GRAM STAIN RESULT: NORMAL
HCT VFR BLD CALC: 33.8 % (ref 37–54)
HEMOGLOBIN: 11.8 G/DL (ref 12.5–16.5)
IMMATURE GRANULOCYTES #: 0.03 E9/L
IMMATURE GRANULOCYTES %: 0.4 % (ref 0–5)
INR BLD: 1.3
LYMPHOCYTES ABSOLUTE: 0.69 E9/L (ref 1.5–4)
LYMPHOCYTES RELATIVE PERCENT: 10.1 % (ref 20–42)
MCH RBC QN AUTO: 32 PG (ref 26–35)
MCHC RBC AUTO-ENTMCNC: 34.9 % (ref 32–34.5)
MCV RBC AUTO: 91.6 FL (ref 80–99.9)
MONOCYTES ABSOLUTE: 1.04 E9/L (ref 0.1–0.95)
MONOCYTES RELATIVE PERCENT: 15.2 % (ref 2–12)
NEUTROPHILS ABSOLUTE: 4.93 E9/L (ref 1.8–7.3)
NEUTROPHILS RELATIVE PERCENT: 72.1 % (ref 43–80)
PDW BLD-RTO: 12.4 FL (ref 11.5–15)
PLATELET # BLD: 233 E9/L (ref 130–450)
PMV BLD AUTO: 9.7 FL (ref 7–12)
POTASSIUM SERPL-SCNC: 4 MMOL/L (ref 3.5–5)
PROTHROMBIN TIME: 15.5 SEC (ref 9.3–12.4)
RBC # BLD: 3.69 E12/L (ref 3.8–5.8)
SODIUM BLD-SCNC: 135 MMOL/L (ref 132–146)
URINE CULTURE, ROUTINE: NORMAL
WBC # BLD: 6.8 E9/L (ref 4.5–11.5)

## 2019-08-01 PROCEDURE — 86850 RBC ANTIBODY SCREEN: CPT

## 2019-08-01 PROCEDURE — 51798 US URINE CAPACITY MEASURE: CPT

## 2019-08-01 PROCEDURE — 2580000003 HC RX 258: Performed by: SPECIALIST

## 2019-08-01 PROCEDURE — 36415 COLL VENOUS BLD VENIPUNCTURE: CPT

## 2019-08-01 PROCEDURE — 6360000002 HC RX W HCPCS: Performed by: SPECIALIST

## 2019-08-01 PROCEDURE — 99222 1ST HOSP IP/OBS MODERATE 55: CPT | Performed by: THORACIC SURGERY (CARDIOTHORACIC VASCULAR SURGERY)

## 2019-08-01 PROCEDURE — 85025 COMPLETE CBC W/AUTO DIFF WBC: CPT

## 2019-08-01 PROCEDURE — 85610 PROTHROMBIN TIME: CPT

## 2019-08-01 PROCEDURE — 86900 BLOOD TYPING SEROLOGIC ABO: CPT

## 2019-08-01 PROCEDURE — 80048 BASIC METABOLIC PNL TOTAL CA: CPT

## 2019-08-01 PROCEDURE — 99232 SBSQ HOSP IP/OBS MODERATE 35: CPT | Performed by: INTERNAL MEDICINE

## 2019-08-01 PROCEDURE — 85730 THROMBOPLASTIN TIME PARTIAL: CPT

## 2019-08-01 PROCEDURE — 88112 CYTOPATH CELL ENHANCE TECH: CPT

## 2019-08-01 PROCEDURE — 2140000000 HC CCU INTERMEDIATE R&B

## 2019-08-01 PROCEDURE — 86901 BLOOD TYPING SEROLOGIC RH(D): CPT

## 2019-08-01 PROCEDURE — 97530 THERAPEUTIC ACTIVITIES: CPT

## 2019-08-01 PROCEDURE — 6370000000 HC RX 637 (ALT 250 FOR IP): Performed by: FAMILY MEDICINE

## 2019-08-01 PROCEDURE — 51702 INSERT TEMP BLADDER CATH: CPT

## 2019-08-01 RX ADMIN — ERTAPENEM SODIUM 1000 MG: 1 INJECTION, POWDER, LYOPHILIZED, FOR SOLUTION INTRAMUSCULAR; INTRAVENOUS at 18:51

## 2019-08-01 RX ADMIN — FUROSEMIDE 40 MG: 40 TABLET ORAL at 08:30

## 2019-08-01 RX ADMIN — CALCIUM 500 MG: 500 TABLET ORAL at 08:28

## 2019-08-01 RX ADMIN — OXYBUTYNIN CHLORIDE 5 MG: 5 TABLET, EXTENDED RELEASE ORAL at 08:28

## 2019-08-01 RX ADMIN — PRIMIDONE 50 MG: 50 TABLET ORAL at 08:27

## 2019-08-01 RX ADMIN — TAMSULOSIN HYDROCHLORIDE 0.4 MG: 0.4 CAPSULE ORAL at 08:29

## 2019-08-01 RX ADMIN — PRIMIDONE 50 MG: 50 TABLET ORAL at 22:05

## 2019-08-01 RX ADMIN — LISINOPRIL 20 MG: 20 TABLET ORAL at 08:28

## 2019-08-01 ASSESSMENT — PAIN SCALES - GENERAL
PAINLEVEL_OUTOF10: 0

## 2019-08-01 NOTE — PLAN OF CARE
Problem: Falls - Risk of:  Goal: Will remain free from falls  Description  Will remain free from falls  8/1/2019 1536 by Karan Zuniga RN  Outcome: Met This Shift  8/1/2019 0942 by Karan Zuniga RN  Outcome: Met This Shift     Problem: Risk for Impaired Skin Integrity  Goal: Tissue integrity - skin and mucous membranes  Description  Structural intactness and normal physiological function of skin and  mucous membranes. Outcome: Met This Shift     Problem: Pain:  Description  Pain management should include both nonpharmacologic and pharmacologic interventions.   Goal: Control of acute pain  Description  Control of acute pain  8/1/2019 0649 by Adin Caballero RN  Outcome: Met This Shift

## 2019-08-01 NOTE — CONSULTS
CTS Consult    Patient name: Ailyn Gonzalez    Reason for consult: Pleural effusion    Referring Physician: Dr. Claire Roblero    Primary Care Physician: Glenda Orozco MD    Date of service: 8/1/2019    Chief Complaint: CP, SOB    HPI: 80year old active man who is admitted with right sided pleuritic CP and SOB. He was found to have a generous pleural effusion on the right which was tapped for 1100 ccs. The fluid was cloudy, but pH was normal.  He also had a fever up to 101.7. WBC has been normal.  Currently he feels ok. His SOB has improved but he still has some level of pleuritic pain. He denies chills or night sweats. Allergies:    Allergies   Allergen Reactions    Pcn [Penicillins] Rash       Home medications:    Current Facility-Administered Medications   Medication Dose Route Frequency Provider Last Rate Last Dose    acetaminophen (TYLENOL) tablet 650 mg  650 mg Oral Q6H PRN Tito Muhammad MD   650 mg at 07/31/19 1715    calcium elemental (OSCAL) tablet 500 mg  500 mg Oral Daily Glenda Orozco MD   500 mg at 08/01/19 0828    lisinopril (PRINIVIL;ZESTRIL) tablet 20 mg  20 mg Oral Daily Glenda Orozco MD   20 mg at 08/01/19 0828    oxybutynin (DITROPAN-XL) extended release tablet 5 mg  5 mg Oral Daily Glenda Orozco MD   5 mg at 08/01/19 8092    primidone (MYSOLINE) tablet 50 mg  50 mg Oral BID Gelnda Orozco MD   50 mg at 08/01/19 0827    tamsulosin (FLOMAX) capsule 0.4 mg  0.4 mg Oral Daily Glenda Orozco MD   0.4 mg at 08/01/19 2209    furosemide (LASIX) tablet 40 mg  40 mg Oral Daily Glenda Orozco MD   40 mg at 08/01/19 0830    perflutren lipid microspheres (DEFINITY) injection 1.65 mg  1.5 mL Intravenous ONCE PRN Glenda Orozco MD        nicotine (NICODERM CQ) 7 MG/24HR 1 patch  1 patch Transdermal Daily Glenda Orozco MD   1 patch at 08/01/19 6775    perflutren lipid microspheres (DEFINITY) injection 1.65 mg  1.5 mL Intravenous ONCE PRN Jacob Alcantar 08/01/19  0618   BUN 15 21 27*   CREATININE 1.0 1.1 1.1       Objective:  Vitals /62   Pulse 95   Temp 97 °F (36.1 °C) (Temporal)   Resp 18   Ht 5' 10\" (1.778 m)   Wt 140 lb 9.6 oz (63.8 kg)   SpO2 95%   BMI 20.17 kg/m²   General Appearance: Pleasant 80y.o. year old male who appears stated age. Communicates well, no acute distress. HEENT: Head is normocephalic, atraumatic. EOMs intact, PERRL. Trachea midline. Lungs: Normal respiratory rate and normal effort. He is not in respiratory distress. Breath sounds clear to auscultation. No wheezes. Decreased on right  Heart: Normal rate. Regular rhythm. S1 normal and S2 normal. Positive for murmur. Chest: Symmetric chest wall expansion. Extremities: Normal range of motion. Neurological: Patient is alert and oriented to person, place and time. Patient has normal reflexes. Skin: Warm and dry. Abdomen: Abdomen is soft and non-distended. Bowel sounds are normal. There is no abdominal tenderness tenderness. There is no guarding. There is no mass. Pulses: Distal pulses are intact. Skin: Warm and dry without lesions. Assessment: Pleural effusion        Plan: Will repeat his CT tomorrow morning. If it is no better than yesterdays will proceed with VATS tomorrow afternoon.   Thank you  Shelby Cowden      Electronically signed by Shelby Cowden, MD on 8/1/2019 at 10:47 AM

## 2019-08-01 NOTE — PROGRESS NOTES
 oxybutynin  5 mg Oral Daily    primidone  50 mg Oral BID    tamsulosin  0.4 mg Oral Daily    furosemide  40 mg Oral Daily    nicotine  1 patch Transdermal Daily       Physical Exam:  General Appearance: appears comfortable in no acute distress. HEENT: Normocephalic atraumatic without obvious abnormality   Neck: Lips, mucosa, and tongue normal.  Supple, symmetrical, trachea midline,   Lung: Breath sounds CTA left , faint crackles right posterior base , no wheezing no rhonchi . Respirations   unlabored. Symmetrical expansion. Heart: RRR, normal S1, S2. No MRG  Abdomen: Soft, NT, ND. BS present x 4 quadrants. No bruit or organomegaly. Extremities: Pedal pulses 2+ symmetric b/l. Extremities normal, no cyanosis, clubbing, or edema. Musculokeletal: No joint swelling, no muscle tenderness. ROM normal in all joints of extremities. Neurologic: Mental status: Alert and Oriented X3 . Pertinent/ New Labs and Imaging Studies     Imaging Personally Reviewed:     INDICATION:  right empyema         COMPARISON: Previous examination 7/29/2019       FINDINGS:   There is cardiomegaly. There is persistent aneurysm of the aorta with   the ascending thoracic aorta measuring 4.1 x 4.2 cm and proximal   descending thoracic aorta measuring 3.8 x 3.7 cm. The distal   descending thoracic aorta is 5.2 x 4.2 cm. There is coronary artery   calcification. Nonspecific lymph nodes measuring up to 9 mm are   identified in the mediastinum. A 6 mm dense material is identified in   the trachea. An aspirated foreign body is suspected. Patchy   infiltrates and pleural effusions are identified in the right lower   lobe with improvement in pleural effusion. There is loculated pleural   effusion and nodular pleural thickening along the lateral right chest   wall. There is minimal atelectasis in the left base. The liver is of   normal architecture.  The previously noted low-attenuation lesions are   present.           Impression 9. [place]  10. Elevated PSA, BPH    Plan:     1. Pleural fluid studies-await cytology, cytogenetics, flow and cell counts  2. Continue incentive spirometry   3. Follow cxr   4. Cardiology following  5. ID following   6. CTS consult for VATS  decortication -developing empyema   7. Wean 02 as tolerated , keep pulse oximetry >92%         This plan of care was reviewed in collaboration with Dr. Ankit Hill       Electronically signed by RANDY Trinh on 8/1/2019 at 10:15 AM      I personally saw, examined, and cared for the patient. Labs, medications, radiographs reviewed. I agree with history exam and plans detailed in NP note. CTS consult to evaluate for decortication of the right lung. Await cultures and histololgy    Jan Mora M.D.    Pulmonary/Critical Care Medicine

## 2019-08-01 NOTE — PROGRESS NOTES
6.3 07/30/2019    HGB 11.8 (L) 08/01/2019    HCT 33.8 (L) 08/01/2019    MCV 91.6 08/01/2019     08/01/2019     Lab Results   Component Value Date    NEUTROABS 4.93 08/01/2019    NEUTROABS 4.12 07/29/2019    NEUTROABS 4.09 04/29/2016     No results found for: CRPHS  Lab Results   Component Value Date    ALT 14 07/30/2019    AST 19 07/30/2019    ALKPHOS 72 07/30/2019    BILITOT 0.6 07/30/2019     Lab Results   Component Value Date     08/01/2019    K 4.0 08/01/2019    CL 96 08/01/2019    CO2 26 08/01/2019    BUN 27 08/01/2019    CREATININE 1.1 08/01/2019    CREATININE 1.1 07/31/2019    CREATININE 1.0 07/30/2019    GFRAA >60 08/01/2019    LABGLOM >60 08/01/2019    GLUCOSE 127 08/01/2019    PROT 6.9 07/30/2019    LABALBU 3.3 07/30/2019    CALCIUM 8.4 08/01/2019    BILITOT 0.6 07/30/2019    ALKPHOS 72 07/30/2019    AST 19 07/30/2019    ALT 14 07/30/2019     No results found for: CRP  No results found for: Siddiqi Haste  Radiology:      Microbiology:   Lab Results   Component Value Date    BC 24 Hours- no growth 07/31/2019     Lab Results   Component Value Date    BLOODCULT2 24 Hours- no growth 07/31/2019     No results found for: WNDABS  No results found for: RESPSMEAR  No results found for: MPNEUMO, CLAMYDCU, LABLEGI, AFBCX, FUNGSM, LABFUNG  No results found for: CULTRESP  No results found for: CXCATHTIP  Body Fluid Culture, Sterile   Date Value Ref Range Status   07/30/2019   Preliminary    Growth not present, incubation continues  24 Hours- growth not present; incubation continues       No results found for: CXSURG  Urine Culture, Routine   Date Value Ref Range Status   07/30/2019 <10,000 CFU/mL  Gram positive organism    Final     No results found for: [facility]    ASSESSMENT:  · Pleuritic pain and effusion and temp                R/o empyema     PLAN:  · Continue invanz  · Check final cultures  · Monitor labs    Jake Waterman  4:31 PM  8/1/2019

## 2019-08-01 NOTE — PROGRESS NOTES
[facility]  Treatment Note    Name: Troy Prior  : 1928  MRN: [id number]    Date of Service: 2019        Evaluating Therapist: Selina Simmonds PT, DPT       Equipment Recommendations: w/w    Room #: 7933/7373-W  DIAGNOSIS: R pleural effusion  PRECAUTIONS: fall risk, bed/chair alarm , [place]    Social:  Pt lives with wife in a 1 floor plan 10 steps and 2 rails to enter per pt. Per chart, pt lives with wife in a 2 story , bed on second, bath on first.   Prior to admission pt walked with no device per pt. Initial Evaluation  Date:  Treatment date:  19      Short Term/ Long Term   Goals   Was pt agreeable to Eval/treatment? Yes  yes    Does pt have pain? 3/10 stomach and shoulders with activity per pt.  7/10 R side    Bed Mobility  Rolling: SBA  Supine to sit: SBA  Sit to supine: SBA  Scooting: NT Rolling: SBA  Supine to sit: SBA  Sit to supine: NT  Scooting: SBA Modified independent   Transfers Sit to stand: min A   Stand to sit: min A   Stand pivot: min A  Sit to stand: SBA   Stand to sit: SBA  Stand pivot: SBA SBA   Ambulation    100 feet with w/w with min A  15 feet with no AD Min A;  300 feet with no AD Min A 150+ feet with w/w with SBA   Stair negotiation: ascended and descended  NT 4 steps with 2 rails with min A 4 steps with 2 rail with min A   AM-PAC Raw Score       Patient education  Pt educated on [employer] approximation/negotiation, deep breathing, gait training, not getting up without assistance     Patient response to education:   Pt verbalized understanding Pt demonstrated skill Pt requires further education in this area   yes yes reinforce     Comments: Pt supine in bed and agreeable to treatment. SpO2 93% and HR 93 on RA. Pt performs bed mobility slowly and with handrail but no physical assist from PT. Pt ambulated with no AD to bathroom to void bowels. Stated he has been difficulty going but able to move bowels prior to mobility.   Ambulated with no AD as he states this is what he does at home. Mildly unsteady and some crossing over. Verbal cues to widen LAITH. Performed stair negotiation x4 steps with 1-2 rails. No Lob on stairs. Denies SOB during ambulation or stairs. Returned to room and stated he needed to move bowels again but unsuccessful. Returned to chair. Instructed to call for assist prior to getting up from chair. Call button in reach. Chair alarm armed. Rn notified. Plan:  Pt is making good progress towards established goals. Continue PT POC.    Pt will be added to the gait team.     Time in: 4210  Time out: [address], PT, DPT  [id number]

## 2019-08-02 ENCOUNTER — APPOINTMENT (OUTPATIENT)
Dept: GENERAL RADIOLOGY | Age: 84
DRG: 163 | End: 2019-08-02
Payer: MEDICARE

## 2019-08-02 ENCOUNTER — APPOINTMENT (OUTPATIENT)
Dept: CT IMAGING | Age: 84
DRG: 163 | End: 2019-08-02
Payer: MEDICARE

## 2019-08-02 ENCOUNTER — ANESTHESIA EVENT (OUTPATIENT)
Dept: OPERATING ROOM | Age: 84
DRG: 163 | End: 2019-08-02
Payer: MEDICARE

## 2019-08-02 ENCOUNTER — ANESTHESIA (OUTPATIENT)
Dept: OPERATING ROOM | Age: 84
DRG: 163 | End: 2019-08-02
Payer: MEDICARE

## 2019-08-02 VITALS — OXYGEN SATURATION: 100 % | DIASTOLIC BLOOD PRESSURE: 74 MMHG | TEMPERATURE: 98.6 F | SYSTOLIC BLOOD PRESSURE: 134 MMHG

## 2019-08-02 LAB
ANION GAP SERPL CALCULATED.3IONS-SCNC: 14 MMOL/L (ref 7–16)
ANION GAP SERPL CALCULATED.3IONS-SCNC: 9 MMOL/L (ref 7–16)
BUN BLDV-MCNC: 26 MG/DL (ref 8–23)
BUN BLDV-MCNC: 28 MG/DL (ref 8–23)
CALCIUM SERPL-MCNC: 8.4 MG/DL (ref 8.6–10.2)
CALCIUM SERPL-MCNC: 8.9 MG/DL (ref 8.6–10.2)
CHLORIDE BLD-SCNC: 97 MMOL/L (ref 98–107)
CHLORIDE BLD-SCNC: 99 MMOL/L (ref 98–107)
CO2: 26 MMOL/L (ref 22–29)
CO2: 26 MMOL/L (ref 22–29)
CREAT SERPL-MCNC: 1 MG/DL (ref 0.7–1.2)
CREAT SERPL-MCNC: 1.2 MG/DL (ref 0.7–1.2)
GFR AFRICAN AMERICAN: >60
GFR AFRICAN AMERICAN: >60
GFR NON-AFRICAN AMERICAN: 57 ML/MIN/1.73
GFR NON-AFRICAN AMERICAN: >60 ML/MIN/1.73
GLUCOSE BLD-MCNC: 155 MG/DL (ref 74–99)
GLUCOSE BLD-MCNC: 192 MG/DL (ref 74–99)
HCT VFR BLD CALC: 33.6 % (ref 37–54)
HCT VFR BLD CALC: 38 % (ref 37–54)
HEMOGLOBIN: 11.3 G/DL (ref 12.5–16.5)
HEMOGLOBIN: 12.6 G/DL (ref 12.5–16.5)
MAGNESIUM: 2.1 MG/DL (ref 1.6–2.6)
MCH RBC QN AUTO: 31 PG (ref 26–35)
MCH RBC QN AUTO: 31 PG (ref 26–35)
MCHC RBC AUTO-ENTMCNC: 33.2 % (ref 32–34.5)
MCHC RBC AUTO-ENTMCNC: 33.6 % (ref 32–34.5)
MCV RBC AUTO: 92.1 FL (ref 80–99.9)
MCV RBC AUTO: 93.6 FL (ref 80–99.9)
PDW BLD-RTO: 12.6 FL (ref 11.5–15)
PDW BLD-RTO: 12.6 FL (ref 11.5–15)
PLATELET # BLD: 256 E9/L (ref 130–450)
PLATELET # BLD: 272 E9/L (ref 130–450)
PMV BLD AUTO: 9.3 FL (ref 7–12)
PMV BLD AUTO: 9.5 FL (ref 7–12)
POTASSIUM SERPL-SCNC: 3.9 MMOL/L (ref 3.5–5)
POTASSIUM SERPL-SCNC: 4.1 MMOL/L (ref 3.5–5)
RBC # BLD: 3.65 E12/L (ref 3.8–5.8)
RBC # BLD: 4.06 E12/L (ref 3.8–5.8)
SODIUM BLD-SCNC: 134 MMOL/L (ref 132–146)
SODIUM BLD-SCNC: 137 MMOL/L (ref 132–146)
WBC # BLD: 7.1 E9/L (ref 4.5–11.5)
WBC # BLD: 7.2 E9/L (ref 4.5–11.5)

## 2019-08-02 PROCEDURE — 36415 COLL VENOUS BLD VENIPUNCTURE: CPT

## 2019-08-02 PROCEDURE — 3600000014 HC SURGERY LEVEL 4 ADDTL 15MIN: Performed by: THORACIC SURGERY (CARDIOTHORACIC VASCULAR SURGERY)

## 2019-08-02 PROCEDURE — 6370000000 HC RX 637 (ALT 250 FOR IP): Performed by: THORACIC SURGERY (CARDIOTHORACIC VASCULAR SURGERY)

## 2019-08-02 PROCEDURE — 87070 CULTURE OTHR SPECIMN AEROBIC: CPT

## 2019-08-02 PROCEDURE — 36620 INSERTION CATHETER ARTERY: CPT | Performed by: ANESTHESIOLOGY

## 2019-08-02 PROCEDURE — 71045 X-RAY EXAM CHEST 1 VIEW: CPT

## 2019-08-02 PROCEDURE — 88342 IMHCHEM/IMCYTCHM 1ST ANTB: CPT

## 2019-08-02 PROCEDURE — 2500000003 HC RX 250 WO HCPCS: Performed by: THORACIC SURGERY (CARDIOTHORACIC VASCULAR SURGERY)

## 2019-08-02 PROCEDURE — 3E0T3BZ INTRODUCTION OF ANESTHETIC AGENT INTO PERIPHERAL NERVES AND PLEXI, PERCUTANEOUS APPROACH: ICD-10-PCS | Performed by: THORACIC SURGERY (CARDIOTHORACIC VASCULAR SURGERY)

## 2019-08-02 PROCEDURE — 3700000000 HC ANESTHESIA ATTENDED CARE: Performed by: THORACIC SURGERY (CARDIOTHORACIC VASCULAR SURGERY)

## 2019-08-02 PROCEDURE — 88341 IMHCHEM/IMCYTCHM EA ADD ANTB: CPT

## 2019-08-02 PROCEDURE — 0BNK4ZZ RELEASE RIGHT LUNG, PERCUTANEOUS ENDOSCOPIC APPROACH: ICD-10-PCS | Performed by: THORACIC SURGERY (CARDIOTHORACIC VASCULAR SURGERY)

## 2019-08-02 PROCEDURE — 6360000002 HC RX W HCPCS: Performed by: PHYSICIAN ASSISTANT

## 2019-08-02 PROCEDURE — 80048 BASIC METABOLIC PNL TOTAL CA: CPT

## 2019-08-02 PROCEDURE — 88112 CYTOPATH CELL ENHANCE TECH: CPT

## 2019-08-02 PROCEDURE — 2580000003 HC RX 258: Performed by: THORACIC SURGERY (CARDIOTHORACIC VASCULAR SURGERY)

## 2019-08-02 PROCEDURE — 2500000003 HC RX 250 WO HCPCS: Performed by: NURSE ANESTHETIST, CERTIFIED REGISTERED

## 2019-08-02 PROCEDURE — 85027 COMPLETE CBC AUTOMATED: CPT

## 2019-08-02 PROCEDURE — 6360000002 HC RX W HCPCS

## 2019-08-02 PROCEDURE — 0BJ08ZZ INSPECTION OF TRACHEOBRONCHIAL TREE, VIA NATURAL OR ARTIFICIAL OPENING ENDOSCOPIC: ICD-10-PCS | Performed by: THORACIC SURGERY (CARDIOTHORACIC VASCULAR SURGERY)

## 2019-08-02 PROCEDURE — 87205 SMEAR GRAM STAIN: CPT

## 2019-08-02 PROCEDURE — 87102 FUNGUS ISOLATION CULTURE: CPT

## 2019-08-02 PROCEDURE — 3E0L4GC INTRODUCTION OF OTHER THERAPEUTIC SUBSTANCE INTO PLEURAL CAVITY, PERCUTANEOUS ENDOSCOPIC APPROACH: ICD-10-PCS | Performed by: THORACIC SURGERY (CARDIOTHORACIC VASCULAR SURGERY)

## 2019-08-02 PROCEDURE — 2709999900 HC NON-CHARGEABLE SUPPLY: Performed by: THORACIC SURGERY (CARDIOTHORACIC VASCULAR SURGERY)

## 2019-08-02 PROCEDURE — 6360000002 HC RX W HCPCS: Performed by: THORACIC SURGERY (CARDIOTHORACIC VASCULAR SURGERY)

## 2019-08-02 PROCEDURE — 7100000001 HC PACU RECOVERY - ADDTL 15 MIN: Performed by: THORACIC SURGERY (CARDIOTHORACIC VASCULAR SURGERY)

## 2019-08-02 PROCEDURE — 2580000003 HC RX 258: Performed by: PHYSICIAN ASSISTANT

## 2019-08-02 PROCEDURE — 0BQT0ZZ REPAIR DIAPHRAGM, OPEN APPROACH: ICD-10-PCS | Performed by: THORACIC SURGERY (CARDIOTHORACIC VASCULAR SURGERY)

## 2019-08-02 PROCEDURE — 71250 CT THORAX DX C-: CPT

## 2019-08-02 PROCEDURE — 88305 TISSUE EXAM BY PATHOLOGIST: CPT

## 2019-08-02 PROCEDURE — 32650 THORACOSCOPY W/PLEURODESIS: CPT | Performed by: THORACIC SURGERY (CARDIOTHORACIC VASCULAR SURGERY)

## 2019-08-02 PROCEDURE — 32652 THORACOSCOPY REM TOTL CORTEX: CPT | Performed by: THORACIC SURGERY (CARDIOTHORACIC VASCULAR SURGERY)

## 2019-08-02 PROCEDURE — 36620 INSERTION CATHETER ARTERY: CPT

## 2019-08-02 PROCEDURE — 0BBN4ZX EXCISION OF RIGHT PLEURA, PERCUTANEOUS ENDOSCOPIC APPROACH, DIAGNOSTIC: ICD-10-PCS | Performed by: THORACIC SURGERY (CARDIOTHORACIC VASCULAR SURGERY)

## 2019-08-02 PROCEDURE — 2580000003 HC RX 258: Performed by: NURSE ANESTHETIST, CERTIFIED REGISTERED

## 2019-08-02 PROCEDURE — 2140000000 HC CCU INTERMEDIATE R&B

## 2019-08-02 PROCEDURE — 6360000002 HC RX W HCPCS: Performed by: NURSE ANESTHETIST, CERTIFIED REGISTERED

## 2019-08-02 PROCEDURE — 43280 LAPAROSCOPY FUNDOPLASTY: CPT | Performed by: THORACIC SURGERY (CARDIOTHORACIC VASCULAR SURGERY)

## 2019-08-02 PROCEDURE — 3700000001 HC ADD 15 MINUTES (ANESTHESIA): Performed by: THORACIC SURGERY (CARDIOTHORACIC VASCULAR SURGERY)

## 2019-08-02 PROCEDURE — 87075 CULTR BACTERIA EXCEPT BLOOD: CPT

## 2019-08-02 PROCEDURE — 94664 DEMO&/EVAL PT USE INHALER: CPT

## 2019-08-02 PROCEDURE — 6370000000 HC RX 637 (ALT 250 FOR IP): Performed by: FAMILY MEDICINE

## 2019-08-02 PROCEDURE — 7100000000 HC PACU RECOVERY - FIRST 15 MIN: Performed by: THORACIC SURGERY (CARDIOTHORACIC VASCULAR SURGERY)

## 2019-08-02 PROCEDURE — 3600000004 HC SURGERY LEVEL 4 BASE: Performed by: THORACIC SURGERY (CARDIOTHORACIC VASCULAR SURGERY)

## 2019-08-02 PROCEDURE — 83735 ASSAY OF MAGNESIUM: CPT

## 2019-08-02 RX ORDER — DEXAMETHASONE SODIUM PHOSPHATE 10 MG/ML
INJECTION INTRAMUSCULAR; INTRAVENOUS PRN
Status: DISCONTINUED | OUTPATIENT
Start: 2019-08-02 | End: 2019-08-02 | Stop reason: SDUPTHER

## 2019-08-02 RX ORDER — ONDANSETRON 2 MG/ML
INJECTION INTRAMUSCULAR; INTRAVENOUS PRN
Status: DISCONTINUED | OUTPATIENT
Start: 2019-08-02 | End: 2019-08-02 | Stop reason: SDUPTHER

## 2019-08-02 RX ORDER — OXYCODONE HYDROCHLORIDE AND ACETAMINOPHEN 5; 325 MG/1; MG/1
2 TABLET ORAL EVERY 4 HOURS PRN
Status: DISCONTINUED | OUTPATIENT
Start: 2019-08-02 | End: 2019-08-05 | Stop reason: HOSPADM

## 2019-08-02 RX ORDER — IPRATROPIUM BROMIDE AND ALBUTEROL SULFATE 2.5; .5 MG/3ML; MG/3ML
1 SOLUTION RESPIRATORY (INHALATION)
Status: DISCONTINUED | OUTPATIENT
Start: 2019-08-02 | End: 2019-08-05 | Stop reason: HOSPADM

## 2019-08-02 RX ORDER — PROPOFOL 10 MG/ML
INJECTION, EMULSION INTRAVENOUS PRN
Status: DISCONTINUED | OUTPATIENT
Start: 2019-08-02 | End: 2019-08-02 | Stop reason: SDUPTHER

## 2019-08-02 RX ORDER — NEOSTIGMINE METHYLSULFATE 1 MG/ML
INJECTION, SOLUTION INTRAVENOUS PRN
Status: DISCONTINUED | OUTPATIENT
Start: 2019-08-02 | End: 2019-08-02 | Stop reason: SDUPTHER

## 2019-08-02 RX ORDER — BUPIVACAINE HYDROCHLORIDE AND EPINEPHRINE 5; 5 MG/ML; UG/ML
INJECTION, SOLUTION EPIDURAL; INTRACAUDAL; PERINEURAL PRN
Status: DISCONTINUED | OUTPATIENT
Start: 2019-08-02 | End: 2019-08-02 | Stop reason: ALTCHOICE

## 2019-08-02 RX ORDER — LIDOCAINE HYDROCHLORIDE 20 MG/ML
INJECTION, SOLUTION INTRAVENOUS PRN
Status: DISCONTINUED | OUTPATIENT
Start: 2019-08-02 | End: 2019-08-02 | Stop reason: SDUPTHER

## 2019-08-02 RX ORDER — ROCURONIUM BROMIDE 10 MG/ML
INJECTION, SOLUTION INTRAVENOUS PRN
Status: DISCONTINUED | OUTPATIENT
Start: 2019-08-02 | End: 2019-08-02 | Stop reason: SDUPTHER

## 2019-08-02 RX ORDER — LABETALOL HYDROCHLORIDE 5 MG/ML
INJECTION, SOLUTION INTRAVENOUS PRN
Status: DISCONTINUED | OUTPATIENT
Start: 2019-08-02 | End: 2019-08-02 | Stop reason: SDUPTHER

## 2019-08-02 RX ORDER — GLYCOPYRROLATE 1 MG/5 ML
SYRINGE (ML) INTRAVENOUS PRN
Status: DISCONTINUED | OUTPATIENT
Start: 2019-08-02 | End: 2019-08-02 | Stop reason: SDUPTHER

## 2019-08-02 RX ORDER — SODIUM CHLORIDE 0.9 % (FLUSH) 0.9 %
10 SYRINGE (ML) INJECTION PRN
Status: DISCONTINUED | OUTPATIENT
Start: 2019-08-02 | End: 2019-08-05 | Stop reason: HOSPADM

## 2019-08-02 RX ORDER — OXYCODONE HYDROCHLORIDE AND ACETAMINOPHEN 5; 325 MG/1; MG/1
1 TABLET ORAL EVERY 4 HOURS PRN
Status: DISCONTINUED | OUTPATIENT
Start: 2019-08-02 | End: 2019-08-05 | Stop reason: HOSPADM

## 2019-08-02 RX ORDER — ACETAMINOPHEN 325 MG/1
650 TABLET ORAL EVERY 6 HOURS
Status: DISPENSED | OUTPATIENT
Start: 2019-08-02 | End: 2019-08-04

## 2019-08-02 RX ORDER — HYDRALAZINE HYDROCHLORIDE 20 MG/ML
INJECTION INTRAMUSCULAR; INTRAVENOUS
Status: COMPLETED
Start: 2019-08-02 | End: 2019-08-02

## 2019-08-02 RX ORDER — SODIUM CHLORIDE 0.9 % (FLUSH) 0.9 %
10 SYRINGE (ML) INJECTION EVERY 12 HOURS SCHEDULED
Status: DISCONTINUED | OUTPATIENT
Start: 2019-08-02 | End: 2019-08-05 | Stop reason: HOSPADM

## 2019-08-02 RX ORDER — ONDANSETRON 2 MG/ML
4 INJECTION INTRAMUSCULAR; INTRAVENOUS EVERY 6 HOURS PRN
Status: DISCONTINUED | OUTPATIENT
Start: 2019-08-02 | End: 2019-08-05 | Stop reason: HOSPADM

## 2019-08-02 RX ORDER — SODIUM CHLORIDE 9 MG/ML
INJECTION, SOLUTION INTRAVENOUS CONTINUOUS PRN
Status: DISCONTINUED | OUTPATIENT
Start: 2019-08-02 | End: 2019-08-02 | Stop reason: SDUPTHER

## 2019-08-02 RX ORDER — BUPIVACAINE HYDROCHLORIDE 5 MG/ML
INJECTION, SOLUTION EPIDURAL; INTRACAUDAL PRN
Status: DISCONTINUED | OUTPATIENT
Start: 2019-08-02 | End: 2019-08-02 | Stop reason: ALTCHOICE

## 2019-08-02 RX ORDER — SENNA PLUS 8.6 MG/1
1 TABLET ORAL NIGHTLY
Status: DISCONTINUED | OUTPATIENT
Start: 2019-08-03 | End: 2019-08-05 | Stop reason: HOSPADM

## 2019-08-02 RX ORDER — FENTANYL CITRATE 50 UG/ML
INJECTION, SOLUTION INTRAMUSCULAR; INTRAVENOUS PRN
Status: DISCONTINUED | OUTPATIENT
Start: 2019-08-02 | End: 2019-08-02 | Stop reason: SDUPTHER

## 2019-08-02 RX ORDER — TALC 100 %
POWDER (GRAM) MISCELLANEOUS PRN
Status: DISCONTINUED | OUTPATIENT
Start: 2019-08-02 | End: 2019-08-02 | Stop reason: ALTCHOICE

## 2019-08-02 RX ORDER — DOCUSATE SODIUM 100 MG/1
100 CAPSULE, LIQUID FILLED ORAL 2 TIMES DAILY
Status: DISCONTINUED | OUTPATIENT
Start: 2019-08-02 | End: 2019-08-05 | Stop reason: HOSPADM

## 2019-08-02 RX ADMIN — SODIUM CHLORIDE: 0.9 INJECTION, SOLUTION INTRAVENOUS at 10:47

## 2019-08-02 RX ADMIN — FENTANYL CITRATE 100 MCG: 50 INJECTION, SOLUTION INTRAMUSCULAR; INTRAVENOUS at 11:01

## 2019-08-02 RX ADMIN — VANCOMYCIN HYDROCHLORIDE 1 G: 1 INJECTION, POWDER, LYOPHILIZED, FOR SOLUTION INTRAVENOUS at 11:12

## 2019-08-02 RX ADMIN — CALCIUM 500 MG: 500 TABLET ORAL at 08:42

## 2019-08-02 RX ADMIN — LABETALOL HYDROCHLORIDE 2.5 MG: 5 INJECTION INTRAVENOUS at 11:46

## 2019-08-02 RX ADMIN — PRIMIDONE 50 MG: 50 TABLET ORAL at 08:42

## 2019-08-02 RX ADMIN — PRIMIDONE 50 MG: 50 TABLET ORAL at 20:11

## 2019-08-02 RX ADMIN — ERTAPENEM SODIUM 1000 MG: 1 INJECTION, POWDER, LYOPHILIZED, FOR SOLUTION INTRAMUSCULAR; INTRAVENOUS at 19:26

## 2019-08-02 RX ADMIN — LABETALOL HYDROCHLORIDE 5 MG: 5 INJECTION INTRAVENOUS at 12:25

## 2019-08-02 RX ADMIN — LABETALOL HYDROCHLORIDE 2.5 MG: 5 INJECTION INTRAVENOUS at 12:07

## 2019-08-02 RX ADMIN — LABETALOL HYDROCHLORIDE 2.5 MG: 5 INJECTION INTRAVENOUS at 12:21

## 2019-08-02 RX ADMIN — Medication 3 MG: at 12:05

## 2019-08-02 RX ADMIN — DOCUSATE SODIUM 100 MG: 100 CAPSULE, LIQUID FILLED ORAL at 20:11

## 2019-08-02 RX ADMIN — OXYCODONE HYDROCHLORIDE AND ACETAMINOPHEN 1 TABLET: 5; 325 TABLET ORAL at 20:11

## 2019-08-02 RX ADMIN — LIDOCAINE HYDROCHLORIDE 100 MG: 20 INJECTION, SOLUTION INTRAVENOUS at 11:01

## 2019-08-02 RX ADMIN — PROPOFOL 150 MG: 10 INJECTION, EMULSION INTRAVENOUS at 11:01

## 2019-08-02 RX ADMIN — LABETALOL HYDROCHLORIDE 2.5 MG: 5 INJECTION INTRAVENOUS at 11:42

## 2019-08-02 RX ADMIN — ACETAMINOPHEN 650 MG: 325 TABLET, FILM COATED ORAL at 20:13

## 2019-08-02 RX ADMIN — ROCURONIUM BROMIDE 40 MG: 10 INJECTION, SOLUTION INTRAVENOUS at 11:01

## 2019-08-02 RX ADMIN — FENTANYL CITRATE 50 MCG: 50 INJECTION, SOLUTION INTRAMUSCULAR; INTRAVENOUS at 12:17

## 2019-08-02 RX ADMIN — FUROSEMIDE 40 MG: 40 TABLET ORAL at 08:42

## 2019-08-02 RX ADMIN — Medication 10 ML: at 20:11

## 2019-08-02 RX ADMIN — OXYCODONE HYDROCHLORIDE AND ACETAMINOPHEN 1 TABLET: 5; 325 TABLET ORAL at 14:39

## 2019-08-02 RX ADMIN — LABETALOL HYDROCHLORIDE 2.5 MG: 5 INJECTION INTRAVENOUS at 12:19

## 2019-08-02 RX ADMIN — ONDANSETRON HYDROCHLORIDE 4 MG: 2 INJECTION, SOLUTION INTRAMUSCULAR; INTRAVENOUS at 11:29

## 2019-08-02 RX ADMIN — TAMSULOSIN HYDROCHLORIDE 0.4 MG: 0.4 CAPSULE ORAL at 08:42

## 2019-08-02 RX ADMIN — DEXAMETHASONE SODIUM PHOSPHATE 10 MG: 10 INJECTION INTRAMUSCULAR; INTRAVENOUS at 11:12

## 2019-08-02 RX ADMIN — FENTANYL CITRATE 50 MCG: 50 INJECTION, SOLUTION INTRAMUSCULAR; INTRAVENOUS at 11:23

## 2019-08-02 RX ADMIN — Medication 0.6 MG: at 12:05

## 2019-08-02 RX ADMIN — ACETAMINOPHEN 650 MG: 325 TABLET, FILM COATED ORAL at 14:40

## 2019-08-02 RX ADMIN — IPRATROPIUM BROMIDE AND ALBUTEROL SULFATE 1 AMPULE: .5; 3 SOLUTION RESPIRATORY (INHALATION) at 20:50

## 2019-08-02 RX ADMIN — LABETALOL HYDROCHLORIDE 2.5 MG: 5 INJECTION INTRAVENOUS at 11:26

## 2019-08-02 RX ADMIN — OXYBUTYNIN CHLORIDE 5 MG: 5 TABLET, EXTENDED RELEASE ORAL at 08:42

## 2019-08-02 RX ADMIN — HYDRALAZINE HYDROCHLORIDE: 20 INJECTION, SOLUTION INTRAMUSCULAR; INTRAVENOUS at 14:41

## 2019-08-02 ASSESSMENT — PULMONARY FUNCTION TESTS
PIF_VALUE: 22
PIF_VALUE: 3
PIF_VALUE: 17
PIF_VALUE: 17
PIF_VALUE: 27
PIF_VALUE: 14
PIF_VALUE: 18
PIF_VALUE: 19
PIF_VALUE: 17
PIF_VALUE: 19
PIF_VALUE: 26
PIF_VALUE: 20
PIF_VALUE: 2
PIF_VALUE: 22
PIF_VALUE: 19
PIF_VALUE: 25
PIF_VALUE: 20
PIF_VALUE: 1
PIF_VALUE: 26
PIF_VALUE: 19
PIF_VALUE: 19
PIF_VALUE: 15
PIF_VALUE: 23
PIF_VALUE: 3
PIF_VALUE: 4
PIF_VALUE: 7
PIF_VALUE: 29
PIF_VALUE: 5
PIF_VALUE: 1
PIF_VALUE: 22
PIF_VALUE: 5
PIF_VALUE: 1
PIF_VALUE: 19
PIF_VALUE: 19
PIF_VALUE: 17
PIF_VALUE: 1
PIF_VALUE: 28
PIF_VALUE: 19
PIF_VALUE: 22
PIF_VALUE: 19
PIF_VALUE: 26
PIF_VALUE: 19
PIF_VALUE: 27
PIF_VALUE: 1
PIF_VALUE: 26
PIF_VALUE: 19
PIF_VALUE: 28
PIF_VALUE: 19
PIF_VALUE: 23
PIF_VALUE: 27
PIF_VALUE: 19
PIF_VALUE: 19
PIF_VALUE: 24
PIF_VALUE: 19
PIF_VALUE: 1
PIF_VALUE: 16
PIF_VALUE: 25
PIF_VALUE: 24
PIF_VALUE: 14
PIF_VALUE: 1
PIF_VALUE: 10
PIF_VALUE: 3
PIF_VALUE: 4
PIF_VALUE: 25
PIF_VALUE: 25
PIF_VALUE: 2
PIF_VALUE: 27
PIF_VALUE: 1
PIF_VALUE: 1
PIF_VALUE: 17
PIF_VALUE: 1
PIF_VALUE: 5
PIF_VALUE: 1
PIF_VALUE: 23
PIF_VALUE: 1
PIF_VALUE: 27
PIF_VALUE: 16
PIF_VALUE: 19
PIF_VALUE: 2
PIF_VALUE: 1
PIF_VALUE: 20
PIF_VALUE: 23

## 2019-08-02 ASSESSMENT — PAIN SCALES - GENERAL
PAINLEVEL_OUTOF10: 7
PAINLEVEL_OUTOF10: 5
PAINLEVEL_OUTOF10: 0

## 2019-08-02 ASSESSMENT — PAIN DESCRIPTION - PROGRESSION
CLINICAL_PROGRESSION: NOT CHANGED
CLINICAL_PROGRESSION: GRADUALLY WORSENING

## 2019-08-02 ASSESSMENT — PAIN DESCRIPTION - ONSET: ONSET: ON-GOING

## 2019-08-02 ASSESSMENT — PAIN DESCRIPTION - PAIN TYPE
TYPE: SURGICAL PAIN

## 2019-08-02 ASSESSMENT — PAIN DESCRIPTION - FREQUENCY
FREQUENCY: CONTINUOUS
FREQUENCY: CONTINUOUS

## 2019-08-02 ASSESSMENT — PAIN DESCRIPTION - DESCRIPTORS
DESCRIPTORS: ACHING;DISCOMFORT;DULL;CONSTANT
DESCRIPTORS: DISCOMFORT;SORE;CONSTANT

## 2019-08-02 ASSESSMENT — PAIN SCALES - WONG BAKER: WONGBAKER_NUMERICALRESPONSE: 4

## 2019-08-02 ASSESSMENT — ENCOUNTER SYMPTOMS: SHORTNESS OF BREATH: 1

## 2019-08-02 ASSESSMENT — PAIN DESCRIPTION - ORIENTATION
ORIENTATION: RIGHT

## 2019-08-02 ASSESSMENT — PAIN DESCRIPTION - LOCATION
LOCATION: RIB CAGE

## 2019-08-02 NOTE — CARE COORDINATION
SOCIAL WORK/CASEMANAGEMENT TRANSITION OF CARE PLANNING: pt for vats procedure with [facility]vs today. [facility]  is setup. sw to follow. Pt ambulated 100 and then 300' yesterday.  Karina Meyers  8/2/2019

## 2019-08-02 NOTE — PROGRESS NOTES
Patient signed treatment consent and blood permit for VATS procedure with Dr. Shima Blackburn. Patient requesting CTS to explain procedure more thoroughly to him prior to OR. Perfect serve sent to CTS. Vanc ordered on call to [facility]. Requested from pharmacy this AM to have on unit. Patients daughter, Diane Ruff notified of plan to proceed.         Beata Tomlinson RN  8/2/2019  8:02 AM

## 2019-08-02 NOTE — PROGRESS NOTES
Glasses, dentures, and hearing aids obtained from OR. Placed in patients room on bedside table.      Shiv Mark RN  8/2/2019  10:58 AM

## 2019-08-02 NOTE — PROGRESS NOTES
Subjective:    Repeat CT done. Pt in OR for VATS. DW Pulm NP. Objective:    BP (!) 132/54   Pulse 67   Temp 98.6 °F (37 °C)   Resp 14   Ht 5' 10\" (1.778 m)   Wt 140 lb 9.6 oz (63.8 kg)   SpO2 93%   BMI 20.17 kg/m²       CBC:   Lab Results   Component Value Date    WBC 7.2 08/02/2019    RBC 3.65 08/02/2019    HGB 11.3 08/02/2019    HCT 33.6 08/02/2019    MCV 92.1 08/02/2019    MCH 31.0 08/02/2019    MCHC 33.6 08/02/2019    RDW 12.6 08/02/2019     08/02/2019    MPV 9.5 08/02/2019     CMP:    Lab Results   Component Value Date     08/01/2019    K 4.0 08/01/2019    CL 96 08/01/2019    CO2 26 08/01/2019    BUN 27 08/01/2019    CREATININE 1.1 08/01/2019    GFRAA >60 08/01/2019    LABGLOM >60 08/01/2019    GLUCOSE 127 08/01/2019    PROT 6.9 07/30/2019    LABALBU 3.3 07/30/2019    CALCIUM 8.4 08/01/2019    BILITOT 0.6 07/30/2019    ALKPHOS 72 07/30/2019    AST 19 07/30/2019    ALT 14 07/30/2019     PT/INR:    Lab Results   Component Value Date    PROTIME 15.5 08/01/2019    INR 1.3 08/01/2019     PTT:    Lab Results   Component Value Date    APTT 25.3 08/01/2019   [APTT}     Assessment:    Patient Active Problem List   Diagnosis    First degree AV block    Essential hypertension    Hyperlipidemia    Orthostatic hypotension    Near syncope    Transient cerebral ischemia    Status post placement of implantable loop recorder    Age-related nuclear cataract of both eyes    Chronic serous otitis media of right ear    Pleural effusion, right       Plan:    Transfer to Dr Jacobo Artist service if possible at time of OR.  Matthew/radha group covering weekend will  Michael Stark MD, FAAFP  1:05 PM  8/2/2019

## 2019-08-02 NOTE — ANESTHESIA PRE PROCEDURE
Department of Anesthesiology  Preprocedure Note       Name:  Radha Cueto   Age:  80 y.o.  :  1928                                          MRN:  [id number]         Date:  2019      Surgeon: Naomi Kumar):  Molly Callahan MD    Procedure: BRONCHOSCOPY RIGHT VIDEO ASSISTED THORACOSCOPY (Right )    Medications prior to admission:   Prior to Admission medications    Medication Sig Start Date End Date Taking?  Authorizing Provider   oxybutynin (DITROPAN-XL) 5 MG extended release tablet Take 5 mg by mouth daily  1/15/19   Historical Provider, MD   tamsulosin (FLOMAX) 0.4 MG capsule Take 0.4 mg by mouth daily  1/15/19   Historical Provider, MD   calcium 600 MG TABS tablet Take 1 tablet by mouth daily LD 3/8/2019    Historical Provider, MD   ascorbic acid (VITAMIN C) 500 MG tablet Take 500 mg by mouth daily LD 3/8/2019    Historical Provider, MD   lisinopril (PRINIVIL;ZESTRIL) 10 MG tablet Take 10 mg by mouth daily     Historical Provider, MD   vitamin E 400 UNIT capsule Take 800 Units by mouth daily LD 3/8/2019    Historical Provider, MD   BABY ASPIRIN PO Take 81 mg by mouth nightly Ld 2018 per dr. Chato Mckinnon Provider, MD   primidone (MYSOLINE) 50 MG tablet Take 50 mg by mouth 2 times daily Taking for hand tremors  Instructed to take am of procedure    Historical Provider, MD       Current medications:    Current Facility-Administered Medications   Medication Dose Route Frequency Provider Last Rate Last Dose    vancomycin 1000 mg IVPB in 250 mL D5W addavial  1,000 mg Intravenous On Call to OR Mayela Castellanos PA-C        ertapenem (INVANZ) 1 g IVPB minibag  1 g Intravenous Q24H Hanna Baker MD   Stopped at 19 1930    acetaminophen (TYLENOL) tablet 650 mg  650 mg Oral Q6H PRN Sherry Balderrama MD   650 mg at 19 1715    calcium elemental (OSCAL) tablet 500 mg  500 mg Oral Daily Stacy Vasquez MD   500 mg at 19 0828    lisinopril (PRINIVIL;ZESTRIL) tablet 20 mg  20 mg midnight                                                                               BMI:   Wt Readings from Last 3 Encounters:   07/31/19 140 lb 9.6 oz (63.8 kg)   03/14/19 160 lb (72.6 kg)   02/08/19 158 lb (71.7 kg)     Body mass index is 20.17 kg/m². CBC:   Lab Results   Component Value Date    WBC 6.8 08/01/2019    RBC 3.69 08/01/2019    HGB 11.8 08/01/2019    HCT 33.8 08/01/2019    MCV 91.6 08/01/2019    RDW 12.4 08/01/2019     08/01/2019       CMP:   Lab Results   Component Value Date     08/01/2019    K 4.0 08/01/2019    CL 96 08/01/2019    CO2 26 08/01/2019    BUN 27 08/01/2019    CREATININE 1.1 08/01/2019    GFRAA >60 08/01/2019    LABGLOM >60 08/01/2019    GLUCOSE 127 08/01/2019    PROT 6.9 07/30/2019    CALCIUM 8.4 08/01/2019    BILITOT 0.6 07/30/2019    ALKPHOS 72 07/30/2019    AST 19 07/30/2019    ALT 14 07/30/2019       POC Tests: No results for input(s): POCGLU, POCNA, POCK, POCCL, POCBUN, POCHEMO, POCHCT in the last 72 hours. Coags:   Lab Results   Component Value Date    PROTIME 15.5 08/01/2019    INR 1.3 08/01/2019    APTT 25.3 08/01/2019       HCG (If Applicable): No results found for: PREGTESTUR, PREGSERUM, HCG, HCGQUANT     ABGs: No results found for: PHART, PO2ART, ICF0RSD, MJV9DQW, BEART, D0EBBFAI     Type & Screen (If Applicable):  No results found for: LABABO, [address]    Anesthesia Evaluation  Patient summary reviewed and Nursing notes reviewed no history of anesthetic complications:   Airway: Mallampati: III  TM distance: >3 FB   Neck ROM: full  Mouth opening: < 3 FB Dental:    (+) upper dentures and lower dentures      Pulmonary:   (+) shortness of breath: chronic,  decreased breath sounds,                            ROS comment: Patient is not the best historian.  Most history obtained from [facility]   Cardiovascular:    (+) hypertension: mild, CAD: non-obstructive,       ECG reviewed  Rhythm: regular  Rate: normal  Echocardiogram reviewed         Beta Blocker:  Not on Beta Blocker         Neuro/Psych:   (+) TIA, psychiatric history: stable with treatmentdepression/anxiety             GI/Hepatic/Renal: Neg GI/Hepatic/Renal ROS            Endo/Other: Negative Endo/Other ROS                    Abdominal:           Vascular:                                 CT Chest 7/31/2019  FINDINGS:   There is cardiomegaly. There is persistent aneurysm of the aorta with   the ascending thoracic aorta measuring 4.1 x 4.2 cm and proximal   descending thoracic aorta measuring 3.8 x 3.7 cm. The distal   descending thoracic aorta is 5.2 x 4.2 cm. There is coronary artery   calcification. Nonspecific lymph nodes measuring up to 9 mm are   identified in the mediastinum. A 6 mm dense material is identified in   the trachea. An aspirated foreign body is suspected. Patchy   infiltrates and pleural effusions are identified in the right lower   lobe with improvement in pleural effusion. There is loculated pleural   effusion and nodular pleural thickening along the lateral right chest   wall. There is minimal atelectasis in the left base. The liver is of   normal architecture. The previously noted low-attenuation lesions are   present.           Impression   Cardiomegaly with the aneurysm in the aorta. Surveillance recommended.       Persistent patchy infiltrates and pleural effusion in the right lower   lobe and right middle lobe with  decreasing pleural effusion. Pneumonia or edema are considered. There is loculated pleural effusion   and pleural thickening which may be due to developing empyema. Malignancy like  mesothelioma or metastasis has to be considered and   surveillance recommended.       A dense material in the trachea. Aspiration of foreign body is   considered.       ALERT:  THIS IS AN ABNORMAL REPORT     Chest Xray 7/30/2019  Findings:  There is a stable, enlarged cardiomediastinal silhouette   with bibasilar airspace disease, right greater than left, with   bilateral pleural effusions also right 21:11,  Significant changes have occurred  Confirmed by Megan Urena ([id number]) on 7/30/2019 3:40:59 PM          Anesthesia Plan      general     ASA 4       Induction: intravenous. BIS and arterial line  MIPS: Postoperative opioids intended, Prophylactic antiemetics administered, Postoperative trial extubation and One-lung ventilation. Anesthetic plan and risks discussed with patient. Use of blood products discussed with patient whom consented to blood products. Plan discussed with CRNA and attending. Vivian Craig RN   8/2/2019      DOS STAFF ADDENDUM:    Patient seen and examined, physical exam updated as needed, chart reviewed. NPO status confirmed. Anesthetic options and risks discussed with patient/legal guardian. Patient/legal guardian verbalized understanding and agrees to proceed.      Karolyn Ricketts MD  Staff Anesthesiologist  August 2, 2019  8:39 AM

## 2019-08-03 LAB
ANION GAP SERPL CALCULATED.3IONS-SCNC: 14 MMOL/L (ref 7–16)
BUN BLDV-MCNC: 37 MG/DL (ref 8–23)
CALCIUM SERPL-MCNC: 8.7 MG/DL (ref 8.6–10.2)
CHLORIDE BLD-SCNC: 98 MMOL/L (ref 98–107)
CO2: 27 MMOL/L (ref 22–29)
CREAT SERPL-MCNC: 1.2 MG/DL (ref 0.7–1.2)
GFR AFRICAN AMERICAN: >60
GFR NON-AFRICAN AMERICAN: 57 ML/MIN/1.73
GLUCOSE BLD-MCNC: 151 MG/DL (ref 74–99)
GRAM STAIN ORDERABLE: NORMAL
GRAM STAIN ORDERABLE: NORMAL
HCT VFR BLD CALC: 34 % (ref 37–54)
HEMOGLOBIN: 11.6 G/DL (ref 12.5–16.5)
MCH RBC QN AUTO: 31.2 PG (ref 26–35)
MCHC RBC AUTO-ENTMCNC: 34.1 % (ref 32–34.5)
MCV RBC AUTO: 91.4 FL (ref 80–99.9)
PDW BLD-RTO: 12.8 FL (ref 11.5–15)
PLATELET # BLD: 293 E9/L (ref 130–450)
PMV BLD AUTO: 9.9 FL (ref 7–12)
POTASSIUM SERPL-SCNC: 4.8 MMOL/L (ref 3.5–5)
RBC # BLD: 3.72 E12/L (ref 3.8–5.8)
SODIUM BLD-SCNC: 139 MMOL/L (ref 132–146)
WBC # BLD: 10.8 E9/L (ref 4.5–11.5)

## 2019-08-03 PROCEDURE — 6370000000 HC RX 637 (ALT 250 FOR IP): Performed by: THORACIC SURGERY (CARDIOTHORACIC VASCULAR SURGERY)

## 2019-08-03 PROCEDURE — 6360000002 HC RX W HCPCS: Performed by: THORACIC SURGERY (CARDIOTHORACIC VASCULAR SURGERY)

## 2019-08-03 PROCEDURE — 2580000003 HC RX 258: Performed by: THORACIC SURGERY (CARDIOTHORACIC VASCULAR SURGERY)

## 2019-08-03 PROCEDURE — 94640 AIRWAY INHALATION TREATMENT: CPT

## 2019-08-03 PROCEDURE — 2580000003 HC RX 258: Performed by: PHYSICIAN ASSISTANT

## 2019-08-03 PROCEDURE — 2140000000 HC CCU INTERMEDIATE R&B

## 2019-08-03 PROCEDURE — 36415 COLL VENOUS BLD VENIPUNCTURE: CPT

## 2019-08-03 PROCEDURE — 85027 COMPLETE CBC AUTOMATED: CPT

## 2019-08-03 PROCEDURE — 80048 BASIC METABOLIC PNL TOTAL CA: CPT

## 2019-08-03 PROCEDURE — 2700000000 HC OXYGEN THERAPY PER DAY

## 2019-08-03 PROCEDURE — 6360000002 HC RX W HCPCS: Performed by: PHYSICIAN ASSISTANT

## 2019-08-03 RX ADMIN — ACETAMINOPHEN 650 MG: 325 TABLET, FILM COATED ORAL at 04:13

## 2019-08-03 RX ADMIN — LISINOPRIL 20 MG: 20 TABLET ORAL at 08:32

## 2019-08-03 RX ADMIN — CALCIUM 500 MG: 500 TABLET ORAL at 08:32

## 2019-08-03 RX ADMIN — Medication 10 ML: at 08:32

## 2019-08-03 RX ADMIN — OXYCODONE HYDROCHLORIDE AND ACETAMINOPHEN 1 TABLET: 5; 325 TABLET ORAL at 19:56

## 2019-08-03 RX ADMIN — IPRATROPIUM BROMIDE AND ALBUTEROL SULFATE 1 AMPULE: .5; 3 SOLUTION RESPIRATORY (INHALATION) at 21:20

## 2019-08-03 RX ADMIN — VANCOMYCIN HYDROCHLORIDE 1000 MG: 1 INJECTION, POWDER, LYOPHILIZED, FOR SOLUTION INTRAVENOUS at 11:57

## 2019-08-03 RX ADMIN — DOCUSATE SODIUM 100 MG: 100 CAPSULE, LIQUID FILLED ORAL at 08:32

## 2019-08-03 RX ADMIN — Medication 10 ML: at 19:57

## 2019-08-03 RX ADMIN — PRIMIDONE 50 MG: 50 TABLET ORAL at 19:56

## 2019-08-03 RX ADMIN — IPRATROPIUM BROMIDE AND ALBUTEROL SULFATE 1 AMPULE: .5; 3 SOLUTION RESPIRATORY (INHALATION) at 17:16

## 2019-08-03 RX ADMIN — ACETAMINOPHEN 650 MG: 325 TABLET, FILM COATED ORAL at 20:00

## 2019-08-03 RX ADMIN — ACETAMINOPHEN 650 MG: 325 TABLET, FILM COATED ORAL at 16:03

## 2019-08-03 RX ADMIN — ERTAPENEM SODIUM 1000 MG: 1 INJECTION, POWDER, LYOPHILIZED, FOR SOLUTION INTRAMUSCULAR; INTRAVENOUS at 17:33

## 2019-08-03 RX ADMIN — SENNOSIDES 8.6 MG: 8.6 TABLET, FILM COATED ORAL at 19:56

## 2019-08-03 RX ADMIN — ACETAMINOPHEN 650 MG: 325 TABLET, FILM COATED ORAL at 08:32

## 2019-08-03 RX ADMIN — OXYBUTYNIN CHLORIDE 5 MG: 5 TABLET, EXTENDED RELEASE ORAL at 08:32

## 2019-08-03 RX ADMIN — FUROSEMIDE 40 MG: 40 TABLET ORAL at 08:32

## 2019-08-03 RX ADMIN — PRIMIDONE 50 MG: 50 TABLET ORAL at 08:31

## 2019-08-03 RX ADMIN — TAMSULOSIN HYDROCHLORIDE 0.4 MG: 0.4 CAPSULE ORAL at 08:32

## 2019-08-03 RX ADMIN — IPRATROPIUM BROMIDE AND ALBUTEROL SULFATE 1 AMPULE: .5; 3 SOLUTION RESPIRATORY (INHALATION) at 13:14

## 2019-08-03 RX ADMIN — IPRATROPIUM BROMIDE AND ALBUTEROL SULFATE 1 AMPULE: .5; 3 SOLUTION RESPIRATORY (INHALATION) at 09:19

## 2019-08-03 RX ADMIN — DOCUSATE SODIUM 100 MG: 100 CAPSULE, LIQUID FILLED ORAL at 19:56

## 2019-08-03 ASSESSMENT — PAIN SCALES - GENERAL
PAINLEVEL_OUTOF10: 6
PAINLEVEL_OUTOF10: 5
PAINLEVEL_OUTOF10: 3
PAINLEVEL_OUTOF10: 0
PAINLEVEL_OUTOF10: 6
PAINLEVEL_OUTOF10: 0
PAINLEVEL_OUTOF10: 2

## 2019-08-03 ASSESSMENT — PAIN DESCRIPTION - DESCRIPTORS
DESCRIPTORS: DISCOMFORT;DULL;CONSTANT
DESCRIPTORS: DISCOMFORT;ACHING;SORE
DESCRIPTORS: ACHING;CONSTANT;DISCOMFORT;DULL

## 2019-08-03 ASSESSMENT — PAIN DESCRIPTION - ORIENTATION
ORIENTATION: RIGHT

## 2019-08-03 ASSESSMENT — PAIN DESCRIPTION - FREQUENCY
FREQUENCY: CONTINUOUS

## 2019-08-03 ASSESSMENT — PAIN DESCRIPTION - PAIN TYPE
TYPE: SURGICAL PAIN

## 2019-08-03 ASSESSMENT — PAIN DESCRIPTION - ONSET
ONSET: ON-GOING

## 2019-08-03 ASSESSMENT — PAIN DESCRIPTION - PROGRESSION
CLINICAL_PROGRESSION: NOT CHANGED

## 2019-08-03 ASSESSMENT — PAIN DESCRIPTION - LOCATION
LOCATION: RIB CAGE

## 2019-08-03 ASSESSMENT — PAIN - FUNCTIONAL ASSESSMENT: PAIN_FUNCTIONAL_ASSESSMENT: ACTIVITIES ARE NOT PREVENTED

## 2019-08-03 NOTE — OP NOTE
lung  decortication. Multiple pockets were encountered and specimen was sent  for culture of the fluid as well as the pleural peel. Once we freed up  the majority of the lung, the pleura was inspected and noted to be quite  nodular, although anatomy was difficult to ascertain secondary to the  inflammation and fibrosis. Several specimens of the pleura were then  taken off and sent for pathology. We then inflated the lung and noted  it to fill the vast majority of the chest cavity. Two 32-Ukrainian chest  tube was placed, one in the apex, one in the base. 3 gm of talc was  sprayed in the chest to reduce the risk of postoperative recurrent  pleural effusion. The chest tubes were secured. Intercostal nerve  block was done around the incisions. Two incisions were closed with  multiple layers of absorbable stitch. Dry sterile dressings were  applied. The patient tolerated the procedure well. The patient was  extubated on the table and transferred to recovery room in stable  condition. All sponge, instrument, and needle counts were correct at  the end of the case. Alyssa Molina MD    D: 08/02/2019 12:30:37       T: 08/02/2019 22:40:48     [email or web address]  Job#: [id number]     Doc#: [id number]    CC:   Sugey Sparks MD

## 2019-08-03 NOTE — PROGRESS NOTES
POD#1  Awake, alert. No complaints. Vitals:    08/02/19 2330 08/03/19 0400 08/03/19 0650 08/03/19 0831   BP: (!) 150/75 (!) 145/87  (!) 154/86   Pulse: 85 80     Resp: 19 18     Temp: 99 °F (37.2 °C) 98.6 °F (37 °C)     TempSrc: Temporal Temporal     SpO2: 95% 96%     Weight:   142 lb 4.8 oz (64.5 kg)    Height:           Intake/Output Summary (Last 24 hours) at 8/3/2019 0900  Last data filed at 8/3/2019 0650  Gross per 24 hour   Intake 805 ml   Output 1088 ml   Net -283 ml     Recent Labs     08/03/19  0546   HGB 11.6*   HCT 34.0*   BUN 37*   CREATININE 1.2        PE  Cardiac: RRR  Lungs: decreased bases  Chest incision clean. CTs x 2 present and secure   Abd: Soft, +BS  Ext: HOPSON     A/P: Stable s/p Right VATS/Repair of iatrogenic diaphragmatic hernia/VATS total lung decortication/VATS pleural biopsy/VATS talc pleurodesis POD#1   Acute blood loss anemia secondary to  Surgery-- stable   CTs without significant drainage or airleak, will remove Post CT today. Chest tube(s) removed without difficulty. Patient tolerated well  Pre op urinary retention-- urology following.   Per urology cont love   ID following pre op for r/o empyema, abx per ID   On 2L, cont to wean as tolerated to keep spo2 >92%-- pulmonary on    Increase activity as tolerated   Encourage incentive spirometry  This patient's case and care plan was discussed with the attending surgeon

## 2019-08-03 NOTE — PROGRESS NOTES
TCO2, O2SAT    ASSESSMENT AND PLAN          Assessment:   VATS lung decortication and pleural biopsy      Plan:   Await Surgical Path  Antibiotics per ID  Chest tube per CT Surgery    Hunter Phalen, D.O.

## 2019-08-04 LAB
ANION GAP SERPL CALCULATED.3IONS-SCNC: 13 MMOL/L (ref 7–16)
BODY FLUID CULTURE, STERILE: NORMAL
BUN BLDV-MCNC: 35 MG/DL (ref 8–23)
CALCIUM SERPL-MCNC: 8.7 MG/DL (ref 8.6–10.2)
CHLORIDE BLD-SCNC: 97 MMOL/L (ref 98–107)
CO2: 27 MMOL/L (ref 22–29)
CREAT SERPL-MCNC: 1.1 MG/DL (ref 0.7–1.2)
CULTURE SURGICAL: NORMAL
GFR AFRICAN AMERICAN: >60
GFR NON-AFRICAN AMERICAN: >60 ML/MIN/1.73
GLUCOSE BLD-MCNC: 133 MG/DL (ref 74–99)
GRAM STAIN RESULT: NORMAL
GRAM STAIN RESULT: NORMAL
HCT VFR BLD CALC: 33.9 % (ref 37–54)
HEMOGLOBIN: 11.3 G/DL (ref 12.5–16.5)
MCH RBC QN AUTO: 31 PG (ref 26–35)
MCHC RBC AUTO-ENTMCNC: 33.3 % (ref 32–34.5)
MCV RBC AUTO: 93.1 FL (ref 80–99.9)
PDW BLD-RTO: 12.8 FL (ref 11.5–15)
PLATELET # BLD: 269 E9/L (ref 130–450)
PMV BLD AUTO: 9.4 FL (ref 7–12)
POTASSIUM SERPL-SCNC: 4.8 MMOL/L (ref 3.5–5)
RBC # BLD: 3.64 E12/L (ref 3.8–5.8)
SODIUM BLD-SCNC: 137 MMOL/L (ref 132–146)
WBC # BLD: 8.6 E9/L (ref 4.5–11.5)

## 2019-08-04 PROCEDURE — 6370000000 HC RX 637 (ALT 250 FOR IP): Performed by: THORACIC SURGERY (CARDIOTHORACIC VASCULAR SURGERY)

## 2019-08-04 PROCEDURE — 85027 COMPLETE CBC AUTOMATED: CPT

## 2019-08-04 PROCEDURE — 80048 BASIC METABOLIC PNL TOTAL CA: CPT

## 2019-08-04 PROCEDURE — 2580000003 HC RX 258: Performed by: THORACIC SURGERY (CARDIOTHORACIC VASCULAR SURGERY)

## 2019-08-04 PROCEDURE — 2140000000 HC CCU INTERMEDIATE R&B

## 2019-08-04 PROCEDURE — 94640 AIRWAY INHALATION TREATMENT: CPT

## 2019-08-04 PROCEDURE — 36415 COLL VENOUS BLD VENIPUNCTURE: CPT

## 2019-08-04 PROCEDURE — 6360000002 HC RX W HCPCS: Performed by: THORACIC SURGERY (CARDIOTHORACIC VASCULAR SURGERY)

## 2019-08-04 RX ADMIN — DOCUSATE SODIUM 100 MG: 100 CAPSULE, LIQUID FILLED ORAL at 08:10

## 2019-08-04 RX ADMIN — IPRATROPIUM BROMIDE AND ALBUTEROL SULFATE 1 AMPULE: .5; 3 SOLUTION RESPIRATORY (INHALATION) at 20:56

## 2019-08-04 RX ADMIN — ERTAPENEM SODIUM 1000 MG: 1 INJECTION, POWDER, LYOPHILIZED, FOR SOLUTION INTRAMUSCULAR; INTRAVENOUS at 17:40

## 2019-08-04 RX ADMIN — CALCIUM 500 MG: 500 TABLET ORAL at 08:09

## 2019-08-04 RX ADMIN — FUROSEMIDE 40 MG: 40 TABLET ORAL at 08:10

## 2019-08-04 RX ADMIN — IPRATROPIUM BROMIDE AND ALBUTEROL SULFATE 1 AMPULE: .5; 3 SOLUTION RESPIRATORY (INHALATION) at 17:01

## 2019-08-04 RX ADMIN — Medication 10 ML: at 19:40

## 2019-08-04 RX ADMIN — Medication 10 ML: at 08:09

## 2019-08-04 RX ADMIN — PRIMIDONE 50 MG: 50 TABLET ORAL at 08:10

## 2019-08-04 RX ADMIN — ACETAMINOPHEN 650 MG: 325 TABLET, FILM COATED ORAL at 08:09

## 2019-08-04 RX ADMIN — PRIMIDONE 50 MG: 50 TABLET ORAL at 19:39

## 2019-08-04 RX ADMIN — IPRATROPIUM BROMIDE AND ALBUTEROL SULFATE 1 AMPULE: .5; 3 SOLUTION RESPIRATORY (INHALATION) at 14:19

## 2019-08-04 RX ADMIN — LISINOPRIL 20 MG: 20 TABLET ORAL at 08:09

## 2019-08-04 RX ADMIN — OXYBUTYNIN CHLORIDE 5 MG: 5 TABLET, EXTENDED RELEASE ORAL at 08:09

## 2019-08-04 RX ADMIN — OXYCODONE HYDROCHLORIDE AND ACETAMINOPHEN 1 TABLET: 5; 325 TABLET ORAL at 04:39

## 2019-08-04 RX ADMIN — TAMSULOSIN HYDROCHLORIDE 0.4 MG: 0.4 CAPSULE ORAL at 08:12

## 2019-08-04 ASSESSMENT — PAIN SCALES - GENERAL
PAINLEVEL_OUTOF10: 0
PAINLEVEL_OUTOF10: 2
PAINLEVEL_OUTOF10: 0
PAINLEVEL_OUTOF10: 5

## 2019-08-04 ASSESSMENT — PAIN DESCRIPTION - FREQUENCY: FREQUENCY: CONTINUOUS

## 2019-08-04 ASSESSMENT — PAIN DESCRIPTION - PROGRESSION
CLINICAL_PROGRESSION: NOT CHANGED

## 2019-08-04 ASSESSMENT — PAIN DESCRIPTION - ORIENTATION: ORIENTATION: RIGHT

## 2019-08-04 ASSESSMENT — PAIN DESCRIPTION - DESCRIPTORS: DESCRIPTORS: ACHING;BURNING;CONSTANT;CRAMPING

## 2019-08-04 ASSESSMENT — PAIN DESCRIPTION - LOCATION: LOCATION: RIB CAGE

## 2019-08-04 ASSESSMENT — PAIN DESCRIPTION - PAIN TYPE: TYPE: SURGICAL PAIN

## 2019-08-04 ASSESSMENT — PAIN DESCRIPTION - ONSET: ONSET: ON-GOING

## 2019-08-04 ASSESSMENT — PAIN - FUNCTIONAL ASSESSMENT: PAIN_FUNCTIONAL_ASSESSMENT: ACTIVITIES ARE NOT PREVENTED

## 2019-08-04 NOTE — PROGRESS NOTES
POD#2  Awake, alert. No complaints. Vitals:    08/03/19 1915 08/03/19 2330 08/04/19 0400 08/04/19 0730   BP: (!) 149/73 (!) 149/70 (!) 140/63 (!) 158/72   Pulse: 109 107 97 91   Resp: 18 18 18 16   Temp: 99.2 °F (37.3 °C) 98.5 °F (36.9 °C) 98.6 °F (37 °C) 97.9 °F (36.6 °C)   TempSrc: Oral Oral Oral Temporal   SpO2: 96% 96% 92% 92%   Weight:       Height:           Intake/Output Summary (Last 24 hours) at 8/4/2019 0905  Last data filed at 8/4/2019 0390  Gross per 24 hour   Intake 550 ml   Output 640 ml   Net -90 ml     Recent Labs     08/04/19  0553   HGB 11.3*   HCT 33.9*   BUN 35*   CREATININE 1.1        PE  Cardiac: RRR  Lungs: decreased bases  Chest incision clean. CT x 1 present and secure   Abd: Soft, +BS  Ext: HOPSON     A/P: Stable s/p Right VATS/Repair of iatrogenic diaphragmatic hernia/VATS total lung decortication/VATS pleural biopsy/VATS talc pleurodesis POD#2    Acute blood loss anemia secondary to  Surgery-- stable   CT without significant drainage or air leak-- will remove remaining CT today. Chest tube(s) removed without difficulty. Patient tolerated well  Pre op urinary retention-- urology following. Per urology cont love   ID following pre op for r/o empyema, abx per ID   On RA-- cont as tolerated to keep spo2 >92%-- pulmonary on   Okay for DC per CTS when okay with all other services.   CTS to sign off, will see in office in 2 weeks    Increase activity as tolerated   Encourage incentive spirometry  This patient's case and care plan was discussed with the attending surgeon

## 2019-08-04 NOTE — PROGRESS NOTES
Date Value Ref Range Status   07/30/2019 <10,000 CFU/mL  Gram positive organism    Final     No results found for: [address]     ASSESSMENT:  · Pleuritic pain and effusion and temp                R/o empyema   · S/p VATS    PLAN:  · Continue invanz  · Check final cultures  · Monitor labs    Amanda Siemens  10:23 PM  8/3/2019   Pt seen and examined. Above discussed agree with advanced practice nurse. Labs, cultures, and radiographs reviewed. Face to Face encounter occurred. Changes made as necessary.      Mahsa Park MD

## 2019-08-04 NOTE — PROGRESS NOTES
Department of Internal Medicine  Pulmonary/Critical Care Medicine  Consultant Progress Note      SUBJECTIVE:  I personally saw, examined, cared for the patient today. Labs, Radiographs, Medications reviewed. OBJECTIVE:  Awake and alert. Good pain control. Denies SOB  Medications    Current Facility-Administered Medications: sodium chloride flush 0.9 % injection 10 mL, 10 mL, Intravenous, 2 times per day  sodium chloride flush 0.9 % injection 10 mL, 10 mL, Intravenous, PRN  docusate sodium (COLACE) capsule 100 mg, 100 mg, Oral, BID  magnesium hydroxide (MILK OF MAGNESIA) 400 MG/5ML suspension 30 mL, 30 mL, Oral, Daily PRN  senna (SENOKOT) tablet 8.6 mg, 1 tablet, Oral, Nightly  ondansetron (ZOFRAN) injection 4 mg, 4 mg, Intravenous, Q6H PRN  ipratropium-albuterol (DUONEB) nebulizer solution 1 ampule, 1 ampule, Inhalation, Q4H WA  oxyCODONE-acetaminophen (PERCOCET) 5-325 MG per tablet 1 tablet, 1 tablet, Oral, Q4H PRN **OR** oxyCODONE-acetaminophen (PERCOCET) 5-325 MG per tablet 2 tablet, 2 tablet, Oral, Q4H PRN  ertapenem (INVANZ) 1 g IVPB minibag, 1 g, Intravenous, Q24H  acetaminophen (TYLENOL) tablet 650 mg, 650 mg, Oral, Q6H PRN  calcium elemental (OSCAL) tablet 500 mg, 500 mg, Oral, Daily  lisinopril (PRINIVIL;ZESTRIL) tablet 20 mg, 20 mg, Oral, Daily  oxybutynin (DITROPAN-XL) extended release tablet 5 mg, 5 mg, Oral, Daily  primidone (MYSOLINE) tablet 50 mg, 50 mg, Oral, BID  tamsulosin (FLOMAX) capsule 0.4 mg, 0.4 mg, Oral, Daily  furosemide (LASIX) tablet 40 mg, 40 mg, Oral, Daily  nicotine (NICODERM CQ) 7 MG/24HR 1 patch, 1 patch, Transdermal, Daily  Physical    TEMPERATURE:  Current - Temp: 96.6 °F (35.9 °C);  Max - Temp  Av.7 °F (36.5 °C)  Min: 96.6 °F (35.9 °C)  Max: 98.6 °F (37 °C)  RESPIRATIONS RANGE: Resp  Av.2  Min: 16  Max: 18  PULSE RANGE: Pulse  Av.8  Min: 90  Max: 107  BLOOD PRESSURE RANGE:  Systolic (09DPN), CR , Min:102 , YASIR:723   ; Diastolic (31VKW), JRX:26, Min:52, Max:72    PULSE OXIMETRY RANGE: SpO2  Av.2 %  Min: 92 %  Max: 97 %  24HR INTAKE/OUTPUT:      Intake/Output Summary (Last 24 hours) at 2019 194  Last data filed at 2019 1414  Gross per 24 hour   Intake 550 ml   Output 600 ml   Net -50 ml     CONSTITUTIONAL:  awake, alert, cooperative, no apparent distress, and appears stated age  NECK:  Supple, symmetrical, trachea midline, no adenopathy, thyroid symmetric, not enlarged and no tenderness, skin normal  HEMATOLOGIC/LYMPHATICS:  no cervical lymphadenopathy and no supraclavicular lymphadenopathy  BACK:  Symmetric, no curvature, spinous processes are non-tender on palpation, paraspinous muscles are non-tender on palpation, no costal vertebral tenderness  LUNGS:  no increased work of breathing, good air exchange and diminished breath sounds right base  CARDIOVASCULAR:  Normal apical impulse, regular rate and rhythm, normal S1 and S2, no S3 or S4, and no murmur noted  ABDOMEN:  No scars, normal bowel sounds, soft, non-distended, non-tender, no masses palpated, no hepatosplenomegally  Data    CBC with Differential:    Lab Results   Component Value Date    WBC 8.6 2019    RBC 3.64 2019    HGB 11.3 2019    HCT 33.9 2019     2019    MCV 93.1 2019    MCH 31.0 2019    MCHC 33.3 2019    RDW 12.8 2019    LYMPHOPCT 10.1 2019    MONOPCT 15.2 2019    BASOPCT 0.4 2019    MONOSABS 1.04 2019    LYMPHSABS 0.69 2019    EOSABS 0.12 2019    BASOSABS 0.03 2019     BMP:    Lab Results   Component Value Date     2019    K 4.8 2019    CL 97 2019    CO2 27 2019    BUN 35 2019    LABALBU 3.3 2019    CREATININE 1.1 2019    CALCIUM 8.7 2019    GFRAA >60 2019    LABGLOM >60 2019    GLUCOSE 133 2019     ABG:  No results found for: PH, PCO2, PO2, HCO3, BE, THGB, TCO2, O2SAT    ASSESSMENT AND PLAN          Assessment: VATS lung decortication and pleural biopsy      Plan:   Await Surgical Path  Antibiotics per ID    Cesar Mcknight D.O.

## 2019-08-05 VITALS
SYSTOLIC BLOOD PRESSURE: 128 MMHG | BODY MASS INDEX: 20.37 KG/M2 | TEMPERATURE: 98 F | RESPIRATION RATE: 18 BRPM | OXYGEN SATURATION: 94 % | HEART RATE: 95 BPM | HEIGHT: 70 IN | WEIGHT: 142.3 LBS | DIASTOLIC BLOOD PRESSURE: 53 MMHG

## 2019-08-05 PROBLEM — E44.0 MODERATE PROTEIN-CALORIE MALNUTRITION (HCC): Chronic | Status: ACTIVE | Noted: 2019-08-05

## 2019-08-05 LAB
ANION GAP SERPL CALCULATED.3IONS-SCNC: 11 MMOL/L (ref 7–16)
BLOOD CULTURE, ROUTINE: NORMAL
BUN BLDV-MCNC: 45 MG/DL (ref 8–23)
CALCIUM SERPL-MCNC: 8.7 MG/DL (ref 8.6–10.2)
CHLORIDE BLD-SCNC: 100 MMOL/L (ref 98–107)
CO2: 28 MMOL/L (ref 22–29)
CREAT SERPL-MCNC: 1.2 MG/DL (ref 0.7–1.2)
CULTURE, BLOOD 2: NORMAL
GFR AFRICAN AMERICAN: >60
GFR NON-AFRICAN AMERICAN: 57 ML/MIN/1.73
GLUCOSE BLD-MCNC: 123 MG/DL (ref 74–99)
HCT VFR BLD CALC: 32.1 % (ref 37–54)
HEMOGLOBIN: 10.5 G/DL (ref 12.5–16.5)
MCH RBC QN AUTO: 30.9 PG (ref 26–35)
MCHC RBC AUTO-ENTMCNC: 32.7 % (ref 32–34.5)
MCV RBC AUTO: 94.4 FL (ref 80–99.9)
PDW BLD-RTO: 13.1 FL (ref 11.5–15)
PLATELET # BLD: 259 E9/L (ref 130–450)
PMV BLD AUTO: 9.4 FL (ref 7–12)
POTASSIUM SERPL-SCNC: 4.7 MMOL/L (ref 3.5–5)
RBC # BLD: 3.4 E12/L (ref 3.8–5.8)
SODIUM BLD-SCNC: 139 MMOL/L (ref 132–146)
WBC # BLD: 7.2 E9/L (ref 4.5–11.5)

## 2019-08-05 PROCEDURE — 94640 AIRWAY INHALATION TREATMENT: CPT

## 2019-08-05 PROCEDURE — 80048 BASIC METABOLIC PNL TOTAL CA: CPT

## 2019-08-05 PROCEDURE — 2700000000 HC OXYGEN THERAPY PER DAY

## 2019-08-05 PROCEDURE — 85027 COMPLETE CBC AUTOMATED: CPT

## 2019-08-05 PROCEDURE — 97530 THERAPEUTIC ACTIVITIES: CPT

## 2019-08-05 PROCEDURE — 2580000003 HC RX 258: Performed by: THORACIC SURGERY (CARDIOTHORACIC VASCULAR SURGERY)

## 2019-08-05 PROCEDURE — 6370000000 HC RX 637 (ALT 250 FOR IP): Performed by: THORACIC SURGERY (CARDIOTHORACIC VASCULAR SURGERY)

## 2019-08-05 PROCEDURE — 36415 COLL VENOUS BLD VENIPUNCTURE: CPT

## 2019-08-05 RX ORDER — TAMSULOSIN HYDROCHLORIDE 0.4 MG/1
0.4 CAPSULE ORAL DAILY
Qty: 30 CAPSULE | Refills: 3 | Status: SHIPPED | OUTPATIENT
Start: 2019-08-06

## 2019-08-05 RX ORDER — DOXYCYCLINE HYCLATE 100 MG/1
100 CAPSULE ORAL EVERY 12 HOURS SCHEDULED
Status: DISCONTINUED | OUTPATIENT
Start: 2019-08-05 | End: 2019-08-05 | Stop reason: HOSPADM

## 2019-08-05 RX ORDER — DOXYCYCLINE HYCLATE 100 MG/1
100 CAPSULE ORAL EVERY 12 HOURS SCHEDULED
Qty: 20 CAPSULE | Refills: 0 | Status: SHIPPED | OUTPATIENT
Start: 2019-08-05 | End: 2019-08-15

## 2019-08-05 RX ORDER — IPRATROPIUM BROMIDE AND ALBUTEROL SULFATE 2.5; .5 MG/3ML; MG/3ML
3 SOLUTION RESPIRATORY (INHALATION)
Qty: 360 ML | Refills: 0
Start: 2019-08-05

## 2019-08-05 RX ORDER — PRIMIDONE 50 MG/1
50 TABLET ORAL 2 TIMES DAILY
Qty: 90 TABLET | Refills: 3 | Status: SHIPPED | OUTPATIENT
Start: 2019-08-05

## 2019-08-05 RX ORDER — FUROSEMIDE 20 MG/1
20 TABLET ORAL DAILY
Qty: 30 TABLET | Refills: 0 | Status: SHIPPED | OUTPATIENT
Start: 2019-08-05

## 2019-08-05 RX ORDER — PSEUDOEPHEDRINE HCL 30 MG
100 TABLET ORAL 2 TIMES DAILY PRN
Qty: 30 CAPSULE | Refills: 0
Start: 2019-08-05

## 2019-08-05 RX ADMIN — CALCIUM 500 MG: 500 TABLET ORAL at 08:44

## 2019-08-05 RX ADMIN — OXYCODONE HYDROCHLORIDE AND ACETAMINOPHEN 1 TABLET: 5; 325 TABLET ORAL at 10:37

## 2019-08-05 RX ADMIN — TAMSULOSIN HYDROCHLORIDE 0.4 MG: 0.4 CAPSULE ORAL at 08:43

## 2019-08-05 RX ADMIN — IPRATROPIUM BROMIDE AND ALBUTEROL SULFATE 1 AMPULE: .5; 3 SOLUTION RESPIRATORY (INHALATION) at 13:49

## 2019-08-05 RX ADMIN — Medication 10 ML: at 08:44

## 2019-08-05 RX ADMIN — LISINOPRIL 20 MG: 20 TABLET ORAL at 08:44

## 2019-08-05 RX ADMIN — PRIMIDONE 50 MG: 50 TABLET ORAL at 08:44

## 2019-08-05 RX ADMIN — FUROSEMIDE 40 MG: 40 TABLET ORAL at 08:44

## 2019-08-05 RX ADMIN — IPRATROPIUM BROMIDE AND ALBUTEROL SULFATE 1 AMPULE: .5; 3 SOLUTION RESPIRATORY (INHALATION) at 08:55

## 2019-08-05 RX ADMIN — OXYBUTYNIN CHLORIDE 5 MG: 5 TABLET, EXTENDED RELEASE ORAL at 08:43

## 2019-08-05 RX ADMIN — IPRATROPIUM BROMIDE AND ALBUTEROL SULFATE 1 AMPULE: .5; 3 SOLUTION RESPIRATORY (INHALATION) at 16:12

## 2019-08-05 ASSESSMENT — PAIN SCALES - GENERAL
PAINLEVEL_OUTOF10: 0
PAINLEVEL_OUTOF10: 4
PAINLEVEL_OUTOF10: 0
PAINLEVEL_OUTOF10: 0

## 2019-08-05 ASSESSMENT — PAIN DESCRIPTION - PROGRESSION
CLINICAL_PROGRESSION: NOT CHANGED
CLINICAL_PROGRESSION: NOT CHANGED
CLINICAL_PROGRESSION: GRADUALLY IMPROVING
CLINICAL_PROGRESSION: NOT CHANGED

## 2019-08-05 ASSESSMENT — PAIN DESCRIPTION - LOCATION: LOCATION: RIB CAGE

## 2019-08-05 ASSESSMENT — PAIN - FUNCTIONAL ASSESSMENT: PAIN_FUNCTIONAL_ASSESSMENT: PREVENTS OR INTERFERES SOME ACTIVE ACTIVITIES AND ADLS

## 2019-08-05 ASSESSMENT — PAIN DESCRIPTION - FREQUENCY: FREQUENCY: INTERMITTENT

## 2019-08-05 ASSESSMENT — PAIN DESCRIPTION - PAIN TYPE: TYPE: SURGICAL PAIN

## 2019-08-05 ASSESSMENT — PAIN DESCRIPTION - ONSET: ONSET: SUDDEN

## 2019-08-05 ASSESSMENT — PAIN DESCRIPTION - DESCRIPTORS: DESCRIPTORS: ACHING;BURNING;CRAMPING

## 2019-08-05 ASSESSMENT — PAIN DESCRIPTION - ORIENTATION: ORIENTATION: RIGHT

## 2019-08-05 NOTE — DISCHARGE SUMMARY
Take 100 mg by mouth 2 times daily as needed for Constipation  Qty: 30 capsule, Refills: 0      nicotine (NICODERM CQ) 7 MG/24HR Place 1 patch onto the skin daily  Qty: 30 patch, Refills: 3      furosemide (LASIX) 20 MG tablet Take 1 tablet by mouth daily  Qty: 30 tablet, Refills: 0      Respiratory Therapy Supplies (FULL KIT NEBULIZER SET) MISC Use as directed with nebulized medication. Qty: 1 each, Refills: 0    Comments: Supply prescription to Pharmacy or [address] [place] [address] location of patient or coverage preference. Dispense full nebulizer kit - compressor, tubing, mouthpiece, mask, ancillary supplies - per requirements of ordered product, patient preference, or coverage allowances. CONTINUE these medications which have CHANGED    Details   primidone (MYSOLINE) 50 MG tablet Take 1 tablet by mouth 2 times daily  Qty: 90 tablet, Refills: 3      tamsulosin (FLOMAX) 0.4 MG capsule Take 1 capsule by mouth daily  Qty: 30 capsule, Refills: 3         CONTINUE these medications which have NOT CHANGED    Details   calcium 600 MG TABS tablet Take 1 tablet by mouth daily LD 3/8/2019      lisinopril (PRINIVIL;ZESTRIL) 10 MG tablet Take 10 mg by mouth daily       BABY ASPIRIN PO Take 81 mg by mouth nightly Ld 2/28/2018 per dr. Finn taking these medications       oxybutynin (DITROPAN-XL) 5 MG extended release tablet Comments:   Reason for Stopping:         ascorbic acid (VITAMIN C) 500 MG tablet Comments:   Reason for Stopping:         vitamin E 400 UNIT capsule Comments:   Reason for Stopping:             Activity: activity as tolerated  Diet: cardiac diet, diabetic diet, low fat, low cholesterol diet and renal diet  Condition: stable    Follow-up with DR Nas CASTILLO [facility] or hosp readmit.  Fu appt CTS, Pulm, ID and Urology and Cardio    Note that greater then 30 minutes was spent in preparing discharge papers, discussing discharge with patient, medication review,

## 2019-08-05 NOTE — PROGRESS NOTES
Nurse to nurse report called to Juan Pavon @ [facility]. Discharge paper work faxed to [phone].     Sanda Severin, RN  8/5/2019  4:33 PM

## 2019-08-05 NOTE — PROGRESS NOTES
sodium chloride flush  10 mL Intravenous 2 times per day    docusate sodium  100 mg Oral BID    senna  1 tablet Oral Nightly    ipratropium-albuterol  1 ampule Inhalation Q4H WA    calcium elemental  500 mg Oral Daily    lisinopril  20 mg Oral Daily    oxybutynin  5 mg Oral Daily    primidone  50 mg Oral BID    tamsulosin  0.4 mg Oral Daily    furosemide  40 mg Oral Daily    nicotine  1 patch Transdermal Daily       Physical Exam:  General Appearance: appears comfortable in no acute distress. HEENT: Normocephalic atraumatic without obvious abnormality   Neck: Lips, mucosa, and tongue normal.  Supple, symmetrical, trachea midline, no adenopathy;thyroid:  no enlargement/tenderness/nodules or JVD. Lung: Breath sounds CTA. Respirations   unlabored. Symmetrical expansion. Heart: RRR, normal S1, S2. No MRG  Abdomen: Soft, NT, ND. BS present x 4 quadrants. No bruit or organomegaly. Extremities: Pedal pulses 2+ symmetric b/l. Extremities normal, no cyanosis, clubbing, or edema. Musculokeletal: No joint swelling, no muscle tenderness. ROM normal in all joints of extremities. Neurologic: Mental status: Alert and Oriented X3 . Pertinent/ New Labs and Imaging Studies     Imaging Personally Reviewed:      There is a persistent right loculated pleural effusion which is   increased in size from the previous exam with associated atelectasis   and consolidation. No definite empyema is identified. There is redemonstration of a 6 mm density within the trachea in   stable position possibly representing a foreign body or polyp. Stable cardiomegaly and thoracic aortic aneurysm. Multiple stable cysts are noted within the liver. No lymphadenopathy by imaging criteria is identified.           Impression   Interval increase in size of the loculated right pleural effusion. Other incidental findings are unchanged from previous exam.                     Findings:  There is a stable, enlarged cardiomediastinal

## 2019-08-05 NOTE — PROGRESS NOTES
Physical Therapy  [address]  Daily Treatment Note  NAME: Melissa Talamantes  : 1928  MRN: [id number]    Date of Service: 2019   Evaluating Therapist: Nidhi Randall PT, DPT         Equipment Recommendations: w/w     Room #: 6750/1688-P  DIAGNOSIS: R pleural effusion  PRECAUTIONS: fall risk, bed/chair alarm , [place]     Social:  Pt lives with wife in a 1 floor plan 10 steps and 2 rails to enter per pt. Per chart, pt lives with wife in a 2 story , bed on second, bath on first.   Prior to admission pt walked with no device per pt.                Initial Evaluation  Date:  Treatment date:  19      Short Term/ Long Term   Goals   Was pt agreeable to Eval/treatment? Yes  yes     Does pt have pain? 3/10 stomach and shoulders with activity per pt. C/o \"soreness\" on R side      Bed Mobility  Rolling: SBA  Supine to sit: SBA  Sit to supine: SBA  Scooting: NT Rolling: SBA  Supine to sit: SBA  Sit to supine: NT  Scooting: SBA Modified independent   Transfers Sit to stand: min A   Stand to sit: min A   Stand pivot: min A  Sit to stand: SBA   Stand to sit: SBA  Stand pivot: SBA SBA   Ambulation    100 feet with w/w with min A  200 feet with ww + feet with w/w with SBA   Stair negotiation: ascended and descended  NT 4 steps with 2 rails with min A 4 steps with 2 rail with min A   AM-PAC Raw Score          Patient education  Pt was educated on safety and ww approximation    Patient response to education:   Pt verbalized understanding Pt demonstrated skill Pt requires further education in this area   x x x     Additional Comments: PT pleasant and cooperative throughout treatment. Pt given cues for ww approximation and safety. Pt was left supine with call light left by patient    Time in: 1330  Time out: 1345    Pt is making good progress toward established Physical Therapy goals. Continue with physical therapy current plan of care.     [facility]  License

## 2019-08-05 NOTE — PROGRESS NOTES
organism    Final     No results found for: [address]:  · Pleuritic pain and effusion and temp                R/o empyema   · S/p VATS - chest tube removed  · Post op urinary retention, love in place     PLAN:  · Stop invanz and d/c c doxy  · Await path bx  · Check final cultures  · Monitor labs    Kj Bi  1:28 PM  8/5/2019 MD eileen

## 2019-08-07 LAB
ANAEROBIC CULTURE: NORMAL
ANAEROBIC CULTURE: NORMAL

## 2019-08-14 PROBLEM — C45.0 MESOTHELIOMA (PLEURAL) (HCC): Status: ACTIVE | Noted: 2019-08-14

## 2019-08-14 ASSESSMENT — ENCOUNTER SYMPTOMS
SHORTNESS OF BREATH: 0
CHEST TIGHTNESS: 0

## 2019-08-14 NOTE — PROGRESS NOTES
Cardiac Electrophysiology Outpatient Progress Note    Ganga Yu  7/26/1928  Date of Service: 08/20/19  Referring Provider/PCP: Cullen Doty MD  Electrophysiologist: Ayala Burr DO    Patient Active Problem List    Diagnosis Date Noted    Mesothelioma (pleural) ([facility] [address] 08/14/2019    Moderate protein-calorie malnutrition ([facility] [address] 08/05/2019    Pleural effusion, right 07/29/2019    Chronic serous otitis media of right ear 07/27/2018    Age-related nuclear cataract of both eyes 03/22/2018    Status post placement of implantable loop recorder 12/06/2016    Near syncope      Overview Note:     A.  [employer] Reveal ICM: DOI 4/25/16      Transient cerebral ischemia     Orthostatic hypotension 04/29/2016    First degree AV block 04/25/2016    Essential hypertension 04/25/2016    Hyperlipidemia 04/25/2016       Current Outpatient Medications   Medication Sig Dispense Refill    doxycycline hyclate (VIBRAMYCIN) 100 MG capsule Take 100 mg by mouth 2 times daily      mirtazapine (REMERON) 15 MG tablet Take 15 mg by mouth nightly      acetaminophen (TYLENOL) 325 MG tablet Take 650 mg by mouth every 6 hours as needed for Pain or Fever      Balsam [place]-Castor Oil (VENELEX) OINT ointment Apply topically 2 times daily      sertraline (ZOLOFT) 25 MG tablet Take 25 mg by mouth daily      ipratropium-albuterol (DUONEB) 0.5-2.5 (3) MG/3ML SOLN nebulizer solution Inhale 3 mLs into the lungs every 4 hours (while awake) 360 mL 0    primidone (MYSOLINE) 50 MG tablet Take 1 tablet by mouth 2 times daily 90 tablet 3    docusate sodium (COLACE, DULCOLAX) 100 MG CAPS Take 100 mg by mouth 2 times daily as needed for Constipation 30 capsule 0    tamsulosin (FLOMAX) 0.4 MG capsule Take 1 capsule by mouth daily 30 capsule 3    furosemide (LASIX) 20 MG tablet Take 1 tablet by mouth daily 30 tablet 0    calcium 600 MG TABS tablet Take 1 tablet by mouth daily LD 3/8/2019      lisinopril (PRINIVIL;ZESTRIL) 10 MG tablet Take 10 mg by mouth daily       BABY ASPIRIN PO Take 81 mg by mouth nightly Ld 2/28/2018 per dr. Justina Carcamo       No current facility-administered medications for this visit. Allergies   Allergen Reactions    Pcn [Penicillins] Rash       SUBJECTIVE: Sammie Santana presents to the office today for the management of these Electrophysiology conditions: dizziness, bradycardia, PAF, and Reveal ILR. Since our last OV he has undergone a VATS procedure for right-sided pleural effusion at revealing mesothelioma. He had a difficult time recovering from this procedure and has lost approximately 15 to 20 pounds. His ILR is at end of service. There have been no new arrhythmias noted. Review of Systems   Respiratory: Negative for chest tightness and shortness of breath. Cardiovascular: Negative for chest pain. Neurological: Negative for dizziness, syncope, weakness and light-headedness. All other systems reviewed and are negative. PHYSICAL EXAM:  Vitals:    08/20/19 1307   BP: 124/72   Pulse: 88   Resp: 18   Weight: 139 lb (63 kg)   Height: 5' 10\" (1.778 m)     Constitutional: Oriented to person, place, and time. Head: Normocephalic and atraumatic. Eyes: Conjunctivae are normal.   Neck: No hepatojugular reflux and no JVD present. Cardiovascular: S1 normal, S2 normal and intact distal pulses. Normal rate and rhythm. PMI is not displaced. Pulmonary/Chest: Positive rales right greater than left. Decreased breath sounds bilaterally. Abdominal: Soft. Normal appearance and bowel sounds are normal. No tenderness. Musculoskeletal: Normal range of motion of all extremities, no muscle weakness. Neurological: Alert and oriented to person, place, and time. Skin: Skin is warm and dry. No bruising, no ecchymosis and no rash noted. Extremity: No clubbing or cyanosis. No edema.   Linq ICM site: stable, well healed, no evidence of erosion      Last TTE 7/30/2019:  Transthoracic Echocardiography Report (TTE)     Demographics      Patient Name    COLLIN VERA Gender            Male                   L      Medical Record  [id number]     Room Number       6523   Number      Account #       [de-identified]    Procedure Date    07/30/2019      Corporate ID                 Ordering          Alexandro Wallace                                Physician      Accession       [id number]    Referring         Derik Allen                       Physician      Date of Birth   07/26/1928   Sonographer       Lucien Olmos RDCS      Age             89 year(s)   Interpreting      [facility])                                Physician         Physician Cardiology                                                 Patricia Yi MD                                   Any Other     Procedure    Type of Study      TTE procedure:Echo Limited Study.     Procedure Date  Date: 07/30/2019 Start: 07:11 AM    Study Location: Echo Lab  Technical Quality: Adequate visualization    Indications:Dyspnea/SOB and Chest pain. Patient Status: Routine    Height: 70 inches Weight: 152 pounds BSA: 1.86 m^2 BMI: 21.81 kg/m^2    Rhythm: Within normal limits HR: 81 bpm BP: 162/72 mmHg    Allergies    - Penicillins.     Findings      Left Ventricle   Left ventricular size is normal.   Normal LV segmental wall motion, EF 55-60%.       Conclusions      Summary   Limited Echo for LV function.      Left ventricular size is normal.   Normal LV segmental wall motion, EF estimated about 55-60%.   Compared to prior study from 5/2016 which showed EF 65%, moderate Aortic   regurgitaton.      Signature      ----------------------------------------------------------------   Electronically signed by Patricia Yi MD(Interpreting   physician) on 07/30/2019 08:03 AM   ----------------------------------------------------------------     M-Mode/2D Measurements & Calculations      LV Diastolic      LV Systolic Dimension: 2.8 cm   Dimension: 3.7 cm LV Volume Diastolic: 59 ml   LV P.6 %      LV Volume Systolic: 33.5 ml   LV PW Diastolic:  LV EDV/LV EDV Index: 59 ml/32        RV Diastolic   1 cm              ml/m^2LV ESV/LV ESV Index: 29.8      Dimension: 2.2 cm   Septum Diastolic: NH/04XW/ m^2   6.6 cm            EF Calculated: 49.5 %                     LV Mass Index: 65 l/min*m^2   LV Mass: 120.79 g LV Length: 9.5 cm     Doppler Measurements & Calculations      MV Peak E-Wave: 0.65 m/s   MV Peak A-Wave: 1.24 m/s   MV E/A Ratio: 0.52      MV Deceleration Time: 139.2 msec    S/P stress test 16  Patient MRN: [id number]   : 1928   Age: 80 years   Gender: Male       Order Date: 2016 11:27 AM       EXAM: Rest and pharmacologic stress SPECT myocardial perfusion.       INDICATION: Dizziness. Near syncope. Coronary artery risk factors   include hypertension and hyperlipidemia.        COMPARISON: None       PROCEDURE: 11 mCi of Tc-99m MIBI was injected IV and SPECT myocardial   perfusion imaging was performed. Subsequently, after Andres Mis   pharmacologic stress was induced, 35 mCi of Tc-99m MIBI was injected   IV followed by repeat SPECT myocardial perfusion imaging.       FINDINGS: Review of the raw planar data reveals no patient motion   artifacts.       There is a normal distribution of the radiotracer on both the   pharmacologic stress and rest SPECT myocardial perfusion images. No   fixed or reversible myocardial perfusion defect is seen.       The surface rendered gated SPECT images demonstrate normal myocardial   contractility within all segments of the left ventricle.       The end diastolic left ventricular volume is 120 mL. The end systolic   left ventricular volume is 56 mL. The estimated left ventricular   ejection fraction is 53%.           Impression   IMPRESSION:    1. No evidence of pharmacologic stress-induced myocardial ischemia.       2.  No focal wall motion abnormalities are demonstrated.       3. Mild left ventriculomegaly The left

## 2019-08-20 ENCOUNTER — OFFICE VISIT (OUTPATIENT)
Dept: NON INVASIVE DIAGNOSTICS | Age: 84
End: 2019-08-20
Payer: MEDICARE

## 2019-08-20 VITALS
DIASTOLIC BLOOD PRESSURE: 72 MMHG | BODY MASS INDEX: 19.9 KG/M2 | SYSTOLIC BLOOD PRESSURE: 124 MMHG | HEART RATE: 88 BPM | WEIGHT: 139 LBS | RESPIRATION RATE: 18 BRPM | HEIGHT: 70 IN

## 2019-08-20 DIAGNOSIS — Z95.818 STATUS POST PLACEMENT OF IMPLANTABLE LOOP RECORDER: Primary | ICD-10-CM

## 2019-08-20 PROCEDURE — 93285 PRGRMG DEV EVAL SCRMS IP: CPT | Performed by: INTERNAL MEDICINE

## 2019-08-20 PROCEDURE — G8427 DOCREV CUR MEDS BY ELIG CLIN: HCPCS | Performed by: INTERNAL MEDICINE

## 2019-08-20 PROCEDURE — 1111F DSCHRG MED/CURRENT MED MERGE: CPT | Performed by: INTERNAL MEDICINE

## 2019-08-20 PROCEDURE — G8420 CALC BMI NORM PARAMETERS: HCPCS | Performed by: INTERNAL MEDICINE

## 2019-08-20 PROCEDURE — 99214 OFFICE O/P EST MOD 30 MIN: CPT | Performed by: INTERNAL MEDICINE

## 2019-08-20 PROCEDURE — 4004F PT TOBACCO SCREEN RCVD TLK: CPT | Performed by: INTERNAL MEDICINE

## 2019-08-20 PROCEDURE — 4040F PNEUMOC VAC/ADMIN/RCVD: CPT | Performed by: INTERNAL MEDICINE

## 2019-08-20 PROCEDURE — 1123F ACP DISCUSS/DSCN MKR DOCD: CPT | Performed by: INTERNAL MEDICINE

## 2019-08-20 RX ORDER — MIRTAZAPINE 15 MG/1
15 TABLET, FILM COATED ORAL NIGHTLY
COMMUNITY

## 2019-08-20 RX ORDER — SERTRALINE HYDROCHLORIDE 25 MG/1
25 TABLET, FILM COATED ORAL DAILY
COMMUNITY

## 2019-08-20 RX ORDER — DOXYCYCLINE HYCLATE 100 MG/1
100 CAPSULE ORAL 2 TIMES DAILY
COMMUNITY
Start: 2019-08-16 | End: 2019-08-23

## 2019-08-20 RX ORDER — ACETAMINOPHEN 325 MG/1
650 TABLET ORAL EVERY 6 HOURS PRN
COMMUNITY

## 2019-08-20 RX ORDER — CASTOR OIL AND BALSAM, PERU 788; 87 MG/G; MG/G
OINTMENT TOPICAL 2 TIMES DAILY
COMMUNITY

## 2019-08-27 ENCOUNTER — OFFICE VISIT (OUTPATIENT)
Dept: CARDIOTHORACIC SURGERY | Age: 84
End: 2019-08-27

## 2019-08-27 VITALS
BODY MASS INDEX: 19.9 KG/M2 | DIASTOLIC BLOOD PRESSURE: 62 MMHG | SYSTOLIC BLOOD PRESSURE: 112 MMHG | WEIGHT: 139 LBS | HEIGHT: 70 IN | HEART RATE: 87 BPM

## 2019-08-27 DIAGNOSIS — C45.9 MESOTHELIOMA (HCC): ICD-10-CM

## 2019-08-27 DIAGNOSIS — R91.1 PULMONARY NODULE: Primary | ICD-10-CM

## 2019-08-27 PROCEDURE — 99024 POSTOP FOLLOW-UP VISIT: CPT | Performed by: THORACIC SURGERY (CARDIOTHORACIC VASCULAR SURGERY)

## 2019-08-27 ASSESSMENT — ENCOUNTER SYMPTOMS
COUGH: 0
SHORTNESS OF BREATH: 0

## 2019-08-27 NOTE — PROGRESS NOTES
Subjective:      Chief Complaint   Patient presents with    Post-Op Check     RVATS  8/2/19    Anorexia       Patient ID: Artis Man is a 80 y.o. male who presents to office for routine follow up s/p Bronch/Right VATS/Repair of iatrogenic diaphragmatic hernia/VATS total lung decortication/VATS pleural biopsy/VATS talc pleurodesis/Intercostal nerve block on 8/2/19. Patient is doing well, but does complain of anorexia since surgery. Pathology report showed Malignant mesothelioma    HPI    Review of Systems   Constitutional: Positive for appetite change. Negative for chills and fever. Respiratory: Negative for cough and shortness of breath. Cardiovascular: Negative for chest pain and palpitations. Objective:   RVATS inc and CT sites c/di and healing well  Heart:  RRR  Lungs CTA  Skin: warm     Assessment:      S/p Bronch/Right VATS/Repair of iatrogenic diaphragmatic hernia/VATS total lung decortication/VATS pleural biopsy/VATS talc pleurodesis/Intercostal nerve block on 8/2/19. Plan:      No restrictions   Ensure shakes for nutrition, follow with PCP  Continue follow up with PCP, pulm, onc as scheduled. Encouraged to call office with any questions, concerns. Otherwise no further follow up necessary from CTS standpoint.               Krysten Rodgers MD

## 2019-09-09 LAB
FUNGUS (MYCOLOGY) CULTURE: NORMAL
FUNGUS (MYCOLOGY) CULTURE: NORMAL
FUNGUS STAIN: NORMAL
FUNGUS STAIN: NORMAL

## (undated) DEVICE — GLOVE SURG SZ 65 THK91MIL LTX FREE SYN POLYISOPRENE

## (undated) DEVICE — TOWEL,OR,DSP,ST,BLUE,STD,6/PK,12PK/CS: Brand: MEDLINE

## (undated) DEVICE — SINGLE USE MEDICAL DEVICE FOR OPHTHALMIC SURGERY: Brand: SIL. COATED I/A 45 MIL 12/B

## (undated) DEVICE — TUBING, SUCTION, 3/16" X 12', STRAIGHT: Brand: MEDLINE

## (undated) DEVICE — PACK PROC FLD MGMT SYS CENTURION CUST

## (undated) DEVICE — PATIENT RETURN ELECTRODE, SINGLE-USE, CONTACT QUALITY MONITORING, ADULT, WITH 9FT CORD, FOR PATIENTS WEIGING OVER 33LBS. (15KG): Brand: MEGADYNE

## (undated) DEVICE — 1 ML TUBERCULIN SYRINGE,DETACHABLE NEEDLE: Brand: MONOJECT

## (undated) DEVICE — NEEDLE HYPO 25GA L0.625IN BLU POLYPR HUB S STL REG BVL STR

## (undated) DEVICE — NEEDLE FLTR 18GA L1.5IN MEM THK5UM BLNT DISP

## (undated) DEVICE — TROCAR: Brand: KII FIOS FIRST ENTRY

## (undated) DEVICE — SKIN AFFIX SURG ADHESIVE 72/CS 0.55ML: Brand: MEDLINE

## (undated) DEVICE — CANNULA OPHTH 25GA 7 8IN ORNG 45DEG ANG 4MM FR END DOME SHP

## (undated) DEVICE — GLASSES SAFETY PROTCT GRN

## (undated) DEVICE — TRAP,MUCUS SPECIMEN,40CC: Brand: MEDLINE

## (undated) DEVICE — LABEL MED 4 IN SURG PANEL W/ PEN STRL

## (undated) DEVICE — PACK PROCEDURE SURG SURG CATARACT CUSTOM

## (undated) DEVICE — 3M™ MEDIPORE™ + PAD 3564: Brand: 3M™ MEDIPORE™

## (undated) DEVICE — PACK,UNIV, II AURORA: Brand: MEDLINE

## (undated) DEVICE — DRAPE,REIN 53X77,STERILE: Brand: MEDLINE

## (undated) DEVICE — SHIELD EYE W3XL2.5IN UNIV CLR PLAS LTWT

## (undated) DEVICE — SOLUTION IV IRRIG WATER 1000ML POUR BRL 2F7114

## (undated) DEVICE — BLADE OPHTH GRN ROUNDED TIP 1 SIDE SHRP GRINDLESS MINI-BLDE

## (undated) DEVICE — 40436 HEAD REST OCULAR: Brand: 40436 HEAD REST OCULAR

## (undated) DEVICE — CLEANER,CAUTERY TIP,2X2",STERILE: Brand: MEDLINE

## (undated) DEVICE — COTTON STRIP: Brand: DEROYAL

## (undated) DEVICE — PROBE HEMSTAT 18GA ERAS BVL TIP STR BPLR WET-FIELD

## (undated) DEVICE — CAMERA CARDIAC STRYKER 1488 HD

## (undated) DEVICE — BLADE SURG L3IN MYR SPEAR TIP UNPROTECTED W O HNDL S STL

## (undated) DEVICE — SOLUTION IRRIGATION BAL SALT SOLUTION 15 ML STRL BSS

## (undated) DEVICE — 1.5L THIN WALL CAN: Brand: CRD

## (undated) DEVICE — SUTURE PROL SZ 10-0 L18IN NONABSORBABLE BLU L13MM CIF-4 1/4 788G

## (undated) DEVICE — 4-PORT MANIFOLD: Brand: NEPTUNE 2

## (undated) DEVICE — GLOVE ORANGE PI 7 1/2   MSG9075

## (undated) DEVICE — Device

## (undated) DEVICE — SPONGE GZ W4XL4IN RAYON POLY FILL CVR W/ NONWOVEN FAB

## (undated) DEVICE — SET CARDIAC CHEST I

## (undated) DEVICE — CLEARCUT® SLIT KNIFE INTREPID MICRO-COAXIAL SYSTEM 2.4 SB: Brand: CLEARCUT®; INTREPID

## (undated) DEVICE — SET INSTRUMENTS VATS THORACOSCOPY

## (undated) DEVICE — 3M™ IOBAN™ 2 ANTIMICROBIAL INCISE DRAPE 6650EZ: Brand: IOBAN™ 2

## (undated) DEVICE — CATHETER THOR 32FR L23IN PVC 5 EYELET STR ATRAUM

## (undated) DEVICE — Z CONVERTED USE 2273263 SWABSTICK CLN SZ 1S 7.5% PVP IOD SCRB DUO-SWAB

## (undated) DEVICE — DRAIN SURG SGL COLL PT TB FOR ATS BG OASIS

## (undated) DEVICE — WARMER SCP LAP

## (undated) DEVICE — INTENDED FOR TISSUE SEPARATION, AND OTHER PROCEDURES THAT REQUIRE A SHARP SURGICAL BLADE TO PUNCTURE OR CUT.: Brand: BARD-PARKER ® STAINLESS STEEL BLADES

## (undated) DEVICE — CHLORAPREP 26ML ORANGE

## (undated) DEVICE — SOLUTION IRRIG BSS ST 500ML

## (undated) DEVICE — SYRINGE, LUER LOCK, 10ML: Brand: MEDLINE

## (undated) DEVICE — SYRINGE MED 50ML LUERLOCK TIP

## (undated) DEVICE — THE MONARCH® "D" CARTRIDGE IS A SINGLE-USE POLYPROPYLENE CARTRIDGE FOR POSTERIOR CHAMBER IOL DELIVERY: Brand: MONARCH® III

## (undated) DEVICE — SURGICAL PROCEDURE PACK BASIC

## (undated) DEVICE — NEEDLE SPNL 20GA L3.5IN YEL HUB S STL REG WALL FIT STYL W/

## (undated) DEVICE — MARKER,SKIN,PREP-RESISTANT,NON-STERILE: Brand: MEDLINE

## (undated) DEVICE — SCALPEL 15 DEG NO 715

## (undated) DEVICE — KIT SURG W7XL11IN 2 PKT UNTREATED NA

## (undated) DEVICE — SOLUTION IV IRRIG POUR BRL 0.9% SODIUM CHL 2F7124

## (undated) DEVICE — SPONGE EYE L7CM NAT CELOS SPEAR VISITEC VISISPEAR

## (undated) DEVICE — SYRINGE 20ML LL S/C 50

## (undated) DEVICE — BLADE CLIPPER GEN PURP NS

## (undated) DEVICE — 1060 S-DRAPE SPCL INCISE 10/BX 4BX/CS: Brand: STERI-DRAPE™

## (undated) DEVICE — AGENT HEMSTAT W4XL8IN OXIDIZED REGENERATED CELOS ABSRB

## (undated) DEVICE — GARMENT,MEDLINE,DVT,INT,CALF,MED, GEN2: Brand: MEDLINE

## (undated) DEVICE — STANDARD HYPODERMIC NEEDLE,ALUMINUM HUB: Brand: MONOJECT

## (undated) DEVICE — GLOVE SURG SZ 7.5 L11.73IN FNGR THK9.8MIL STRW LTX POLYMER

## (undated) DEVICE — COVER MICROSCOPE KNOB LG

## (undated) DEVICE — GLOVE ORANGE PI 7   MSG9070

## (undated) DEVICE — SYRINGE, LUER LOCK, 5ML: Brand: MEDLINE

## (undated) DEVICE — CATHETER THOR 32FR L23IN PVC 6 EYELET STR ATRAUM

## (undated) DEVICE — KIT,ANTI FOG,W/SPONGE & FLUID,SOFT PACK: Brand: MEDLINE

## (undated) DEVICE — GRADUATE

## (undated) DEVICE — SATINCRESCENT® KNIFE ANGLED BEVEL UP: Brand: SATINCRESCENT®

## (undated) DEVICE — CLOTH SURG PREP PREOPERATIVE CHLORHEXIDINE GLUC 2% READYPREP